# Patient Record
Sex: MALE | Race: WHITE | NOT HISPANIC OR LATINO
[De-identification: names, ages, dates, MRNs, and addresses within clinical notes are randomized per-mention and may not be internally consistent; named-entity substitution may affect disease eponyms.]

---

## 2017-06-06 ENCOUNTER — APPOINTMENT (OUTPATIENT)
Dept: ELECTROPHYSIOLOGY | Facility: CLINIC | Age: 82
End: 2017-06-06

## 2017-06-06 VITALS
WEIGHT: 172 LBS | BODY MASS INDEX: 24.08 KG/M2 | SYSTOLIC BLOOD PRESSURE: 105 MMHG | HEART RATE: 59 BPM | HEIGHT: 71 IN | DIASTOLIC BLOOD PRESSURE: 58 MMHG

## 2017-09-13 ENCOUNTER — APPOINTMENT (OUTPATIENT)
Dept: ELECTROPHYSIOLOGY | Facility: CLINIC | Age: 82
End: 2017-09-13
Payer: MEDICARE

## 2017-09-13 VITALS
HEART RATE: 59 BPM | BODY MASS INDEX: 24.08 KG/M2 | SYSTOLIC BLOOD PRESSURE: 153 MMHG | DIASTOLIC BLOOD PRESSURE: 80 MMHG | HEIGHT: 71 IN | WEIGHT: 172 LBS

## 2017-09-13 PROCEDURE — 93279 PRGRMG DEV EVAL PM/LDLS PM: CPT

## 2018-01-12 ENCOUNTER — APPOINTMENT (OUTPATIENT)
Dept: ELECTROPHYSIOLOGY | Facility: CLINIC | Age: 83
End: 2018-01-12
Payer: MEDICARE

## 2018-01-12 VITALS
HEART RATE: 60 BPM | SYSTOLIC BLOOD PRESSURE: 129 MMHG | DIASTOLIC BLOOD PRESSURE: 64 MMHG | BODY MASS INDEX: 24.08 KG/M2 | WEIGHT: 172 LBS | HEIGHT: 71 IN

## 2018-01-12 PROCEDURE — 93280 PM DEVICE PROGR EVAL DUAL: CPT

## 2018-04-17 ENCOUNTER — APPOINTMENT (OUTPATIENT)
Dept: ELECTROPHYSIOLOGY | Facility: CLINIC | Age: 83
End: 2018-04-17
Payer: MEDICARE

## 2018-04-17 PROCEDURE — 93296 REM INTERROG EVL PM/IDS: CPT

## 2018-04-17 PROCEDURE — 93294 REM INTERROG EVL PM/LDLS PM: CPT

## 2018-08-03 ENCOUNTER — APPOINTMENT (OUTPATIENT)
Dept: ELECTROPHYSIOLOGY | Facility: CLINIC | Age: 83
End: 2018-08-03
Payer: MEDICARE

## 2018-08-03 VITALS — DIASTOLIC BLOOD PRESSURE: 49 MMHG | SYSTOLIC BLOOD PRESSURE: 88 MMHG | HEART RATE: 59 BPM | HEIGHT: 71 IN

## 2018-08-03 VITALS — WEIGHT: 168.4 LBS | BODY MASS INDEX: 23.49 KG/M2

## 2018-08-03 PROCEDURE — 93280 PM DEVICE PROGR EVAL DUAL: CPT

## 2018-11-13 ENCOUNTER — APPOINTMENT (OUTPATIENT)
Dept: ELECTROPHYSIOLOGY | Facility: CLINIC | Age: 83
End: 2018-11-13
Payer: MEDICARE

## 2018-11-13 PROCEDURE — 93294 REM INTERROG EVL PM/LDLS PM: CPT

## 2018-11-13 PROCEDURE — 93296 REM INTERROG EVL PM/IDS: CPT

## 2019-01-06 ENCOUNTER — INPATIENT (INPATIENT)
Facility: HOSPITAL | Age: 84
LOS: 1 days | Discharge: HOME HEALTH | End: 2019-01-08
Payer: MEDICARE

## 2019-01-06 VITALS — WEIGHT: 175.05 LBS | HEIGHT: 70 IN

## 2019-01-06 DIAGNOSIS — I25.10 ATHEROSCLEROTIC HEART DISEASE OF NATIVE CORONARY ARTERY WITHOUT ANGINA PECTORIS: ICD-10-CM

## 2019-01-06 DIAGNOSIS — N40.0 BENIGN PROSTATIC HYPERPLASIA WITHOUT LOWER URINARY TRACT SYMPTOMS: ICD-10-CM

## 2019-01-06 DIAGNOSIS — I48.91 UNSPECIFIED ATRIAL FIBRILLATION: ICD-10-CM

## 2019-01-06 DIAGNOSIS — Z95.0 PRESENCE OF CARDIAC PACEMAKER: Chronic | ICD-10-CM

## 2019-01-06 DIAGNOSIS — Z95.1 PRESENCE OF AORTOCORONARY BYPASS GRAFT: Chronic | ICD-10-CM

## 2019-01-06 DIAGNOSIS — I50.23 ACUTE ON CHRONIC SYSTOLIC (CONGESTIVE) HEART FAILURE: ICD-10-CM

## 2019-01-06 LAB
ALBUMIN SERPL ELPH-MCNC: 3.2 G/DL — LOW (ref 3.3–5)
ALP SERPL-CCNC: 97 U/L — SIGNIFICANT CHANGE UP (ref 40–120)
ALT FLD-CCNC: 44 U/L — SIGNIFICANT CHANGE UP (ref 12–78)
ANION GAP SERPL CALC-SCNC: 8 MMOL/L — SIGNIFICANT CHANGE UP (ref 5–17)
ANISOCYTOSIS BLD QL: SLIGHT — SIGNIFICANT CHANGE UP
APPEARANCE UR: CLEAR — SIGNIFICANT CHANGE UP
AST SERPL-CCNC: 24 U/L — SIGNIFICANT CHANGE UP (ref 15–37)
BACTERIA # UR AUTO: ABNORMAL
BASOPHILS # BLD AUTO: 0.04 K/UL — SIGNIFICANT CHANGE UP (ref 0–0.2)
BASOPHILS NFR BLD AUTO: 0.5 % — SIGNIFICANT CHANGE UP (ref 0–2)
BILIRUB SERPL-MCNC: 0.9 MG/DL — SIGNIFICANT CHANGE UP (ref 0.2–1.2)
BILIRUB UR-MCNC: NEGATIVE — SIGNIFICANT CHANGE UP
BIZARRE PLATELETS BLD QL SMEAR: PRESENT — SIGNIFICANT CHANGE UP
BUN SERPL-MCNC: 34 MG/DL — HIGH (ref 7–23)
BURR CELLS BLD QL SMEAR: PRESENT — SIGNIFICANT CHANGE UP
CALCIUM SERPL-MCNC: 8.9 MG/DL — SIGNIFICANT CHANGE UP (ref 8.5–10.1)
CHLORIDE SERPL-SCNC: 112 MMOL/L — HIGH (ref 96–108)
CO2 SERPL-SCNC: 23 MMOL/L — SIGNIFICANT CHANGE UP (ref 22–31)
COLOR SPEC: YELLOW — SIGNIFICANT CHANGE UP
CREAT SERPL-MCNC: 1.43 MG/DL — HIGH (ref 0.5–1.3)
DIFF PNL FLD: NEGATIVE — SIGNIFICANT CHANGE UP
ELLIPTOCYTES BLD QL SMEAR: SLIGHT — SIGNIFICANT CHANGE UP
EOSINOPHIL # BLD AUTO: 0.1 K/UL — SIGNIFICANT CHANGE UP (ref 0–0.5)
EOSINOPHIL NFR BLD AUTO: 1.3 % — SIGNIFICANT CHANGE UP (ref 0–6)
EPI CELLS # UR: SIGNIFICANT CHANGE UP
GLUCOSE SERPL-MCNC: 146 MG/DL — HIGH (ref 70–99)
GLUCOSE UR QL: NEGATIVE MG/DL — SIGNIFICANT CHANGE UP
HCT VFR BLD CALC: 34.6 % — LOW (ref 39–50)
HGB BLD-MCNC: 11.2 G/DL — LOW (ref 13–17)
IMM GRANULOCYTES NFR BLD AUTO: 0.4 % — SIGNIFICANT CHANGE UP (ref 0–1.5)
INR BLD: 1.18 RATIO — HIGH (ref 0.88–1.16)
KETONES UR-MCNC: NEGATIVE — SIGNIFICANT CHANGE UP
LEUKOCYTE ESTERASE UR-ACNC: ABNORMAL
LYMPHOCYTES # BLD AUTO: 0.49 K/UL — LOW (ref 1–3.3)
LYMPHOCYTES # BLD AUTO: 6.2 % — LOW (ref 13–44)
MACROCYTES BLD QL: SLIGHT — SIGNIFICANT CHANGE UP
MANUAL SMEAR VERIFICATION: SIGNIFICANT CHANGE UP
MCHC RBC-ENTMCNC: 32 PG — SIGNIFICANT CHANGE UP (ref 27–34)
MCHC RBC-ENTMCNC: 32.4 GM/DL — SIGNIFICANT CHANGE UP (ref 32–36)
MCV RBC AUTO: 98.9 FL — SIGNIFICANT CHANGE UP (ref 80–100)
MONOCYTES # BLD AUTO: 0.85 K/UL — SIGNIFICANT CHANGE UP (ref 0–0.9)
MONOCYTES NFR BLD AUTO: 10.7 % — SIGNIFICANT CHANGE UP (ref 2–14)
NEUTROPHILS # BLD AUTO: 6.45 K/UL — SIGNIFICANT CHANGE UP (ref 1.8–7.4)
NEUTROPHILS NFR BLD AUTO: 80.9 % — HIGH (ref 43–77)
NITRITE UR-MCNC: NEGATIVE — SIGNIFICANT CHANGE UP
NRBC # BLD: 0 /100 WBCS — SIGNIFICANT CHANGE UP (ref 0–0)
NT-PROBNP SERPL-SCNC: 8544 PG/ML — HIGH (ref 0–450)
OVALOCYTES BLD QL SMEAR: SLIGHT — SIGNIFICANT CHANGE UP
PH UR: 6 — SIGNIFICANT CHANGE UP (ref 5–8)
PLAT MORPH BLD: NORMAL — SIGNIFICANT CHANGE UP
PLATELET # BLD AUTO: 111 K/UL — LOW (ref 150–400)
POIKILOCYTOSIS BLD QL AUTO: SLIGHT — SIGNIFICANT CHANGE UP
POTASSIUM SERPL-MCNC: 4.5 MMOL/L — SIGNIFICANT CHANGE UP (ref 3.5–5.3)
POTASSIUM SERPL-SCNC: 4.5 MMOL/L — SIGNIFICANT CHANGE UP (ref 3.5–5.3)
PROT SERPL-MCNC: 7.4 GM/DL — SIGNIFICANT CHANGE UP (ref 6–8.3)
PROT UR-MCNC: 15 MG/DL
PROTHROM AB SERPL-ACNC: 13.2 SEC — HIGH (ref 10–12.9)
RAPID RVP RESULT: SIGNIFICANT CHANGE UP
RBC # BLD: 3.5 M/UL — LOW (ref 4.2–5.8)
RBC # FLD: 14.6 % — HIGH (ref 10.3–14.5)
RBC BLD AUTO: ABNORMAL
RBC CASTS # UR COMP ASSIST: SIGNIFICANT CHANGE UP /HPF (ref 0–4)
SODIUM SERPL-SCNC: 143 MMOL/L — SIGNIFICANT CHANGE UP (ref 135–145)
SP GR SPEC: 1.01 — SIGNIFICANT CHANGE UP (ref 1.01–1.02)
TROPONIN I SERPL-MCNC: 0.06 NG/ML — HIGH (ref 0.01–0.04)
TROPONIN I SERPL-MCNC: 0.07 NG/ML — HIGH (ref 0.01–0.04)
UROBILINOGEN FLD QL: NEGATIVE MG/DL — SIGNIFICANT CHANGE UP
WBC # BLD: 7.96 K/UL — SIGNIFICANT CHANGE UP (ref 3.8–10.5)
WBC # FLD AUTO: 7.96 K/UL — SIGNIFICANT CHANGE UP (ref 3.8–10.5)
WBC UR QL: ABNORMAL

## 2019-01-06 PROCEDURE — 93010 ELECTROCARDIOGRAM REPORT: CPT

## 2019-01-06 PROCEDURE — 71045 X-RAY EXAM CHEST 1 VIEW: CPT | Mod: 26

## 2019-01-06 PROCEDURE — 99285 EMERGENCY DEPT VISIT HI MDM: CPT

## 2019-01-06 PROCEDURE — 71250 CT THORAX DX C-: CPT | Mod: 26

## 2019-01-06 RX ORDER — CARVEDILOL PHOSPHATE 80 MG/1
12.5 CAPSULE, EXTENDED RELEASE ORAL EVERY 12 HOURS
Qty: 0 | Refills: 0 | Status: DISCONTINUED | OUTPATIENT
Start: 2019-01-06 | End: 2019-01-08

## 2019-01-06 RX ORDER — FUROSEMIDE 40 MG
40 TABLET ORAL ONCE
Qty: 0 | Refills: 0 | Status: COMPLETED | OUTPATIENT
Start: 2019-01-06 | End: 2019-01-06

## 2019-01-06 RX ORDER — ATORVASTATIN CALCIUM 80 MG/1
1 TABLET, FILM COATED ORAL
Qty: 0 | Refills: 0 | COMMUNITY

## 2019-01-06 RX ORDER — FUROSEMIDE 40 MG
20 TABLET ORAL DAILY
Qty: 0 | Refills: 0 | Status: DISCONTINUED | OUTPATIENT
Start: 2019-01-06 | End: 2019-01-07

## 2019-01-06 RX ORDER — TAMSULOSIN HYDROCHLORIDE 0.4 MG/1
0.4 CAPSULE ORAL AT BEDTIME
Qty: 0 | Refills: 0 | Status: DISCONTINUED | OUTPATIENT
Start: 2019-01-06 | End: 2019-01-08

## 2019-01-06 RX ORDER — HEPARIN SODIUM 5000 [USP'U]/ML
5000 INJECTION INTRAVENOUS; SUBCUTANEOUS EVERY 12 HOURS
Qty: 0 | Refills: 0 | Status: DISCONTINUED | OUTPATIENT
Start: 2019-01-06 | End: 2019-01-08

## 2019-01-06 RX ORDER — ALPRAZOLAM 0.25 MG
1 TABLET ORAL
Qty: 0 | Refills: 0 | COMMUNITY

## 2019-01-06 RX ORDER — MEMANTINE HYDROCHLORIDE 10 MG/1
10 TABLET ORAL
Qty: 0 | Refills: 0 | Status: DISCONTINUED | OUTPATIENT
Start: 2019-01-06 | End: 2019-01-08

## 2019-01-06 RX ORDER — MULTIVIT-MIN/FERROUS GLUCONATE 9 MG/15 ML
1 LIQUID (ML) ORAL DAILY
Qty: 0 | Refills: 0 | Status: DISCONTINUED | OUTPATIENT
Start: 2019-01-06 | End: 2019-01-08

## 2019-01-06 RX ORDER — MEMANTINE HYDROCHLORIDE 10 MG/1
1 TABLET ORAL
Qty: 0 | Refills: 0 | COMMUNITY

## 2019-01-06 RX ORDER — LEVOTHYROXINE SODIUM 125 MCG
50 TABLET ORAL DAILY
Qty: 0 | Refills: 0 | Status: DISCONTINUED | OUTPATIENT
Start: 2019-01-06 | End: 2019-01-08

## 2019-01-06 RX ORDER — FERROUS SULFATE 325(65) MG
325 TABLET ORAL DAILY
Qty: 0 | Refills: 0 | Status: DISCONTINUED | OUTPATIENT
Start: 2019-01-06 | End: 2019-01-08

## 2019-01-06 RX ORDER — TAMSULOSIN HYDROCHLORIDE 0.4 MG/1
1 CAPSULE ORAL
Qty: 0 | Refills: 0 | COMMUNITY

## 2019-01-06 RX ORDER — ALPRAZOLAM 0.25 MG
0.25 TABLET ORAL AT BEDTIME
Qty: 0 | Refills: 0 | Status: DISCONTINUED | OUTPATIENT
Start: 2019-01-06 | End: 2019-01-08

## 2019-01-06 RX ORDER — ATORVASTATIN CALCIUM 80 MG/1
40 TABLET, FILM COATED ORAL AT BEDTIME
Qty: 0 | Refills: 0 | Status: DISCONTINUED | OUTPATIENT
Start: 2019-01-06 | End: 2019-01-08

## 2019-01-06 RX ORDER — CHOLECALCIFEROL (VITAMIN D3) 125 MCG
1000 CAPSULE ORAL DAILY
Qty: 0 | Refills: 0 | Status: DISCONTINUED | OUTPATIENT
Start: 2019-01-06 | End: 2019-01-08

## 2019-01-06 RX ORDER — DONEPEZIL HYDROCHLORIDE 10 MG/1
5 TABLET, FILM COATED ORAL AT BEDTIME
Qty: 0 | Refills: 0 | Status: DISCONTINUED | OUTPATIENT
Start: 2019-01-06 | End: 2019-01-08

## 2019-01-06 RX ADMIN — DONEPEZIL HYDROCHLORIDE 5 MILLIGRAM(S): 10 TABLET, FILM COATED ORAL at 21:40

## 2019-01-06 RX ADMIN — TAMSULOSIN HYDROCHLORIDE 0.4 MILLIGRAM(S): 0.4 CAPSULE ORAL at 21:40

## 2019-01-06 RX ADMIN — Medication 1000 UNIT(S): at 19:48

## 2019-01-06 RX ADMIN — CARVEDILOL PHOSPHATE 12.5 MILLIGRAM(S): 80 CAPSULE, EXTENDED RELEASE ORAL at 18:48

## 2019-01-06 RX ADMIN — MEMANTINE HYDROCHLORIDE 10 MILLIGRAM(S): 10 TABLET ORAL at 19:47

## 2019-01-06 RX ADMIN — HEPARIN SODIUM 5000 UNIT(S): 5000 INJECTION INTRAVENOUS; SUBCUTANEOUS at 19:47

## 2019-01-06 RX ADMIN — ATORVASTATIN CALCIUM 40 MILLIGRAM(S): 80 TABLET, FILM COATED ORAL at 21:40

## 2019-01-06 RX ADMIN — Medication 1 TABLET(S): at 19:56

## 2019-01-06 RX ADMIN — Medication 40 MILLIGRAM(S): at 16:00

## 2019-01-06 RX ADMIN — Medication 325 MILLIGRAM(S): at 19:47

## 2019-01-06 NOTE — ED PROVIDER NOTE - MEDICAL DECISION MAKING DETAILS
Pt with evidence of CHF exacerbation.  Afebrile, normal WBC, pending RVP.  Given Lasix 40 mg IV.  EKG nonischemic, paced.  Admit to medicine tele service.

## 2019-01-06 NOTE — ED ADULT NURSE NOTE - NSIMPLEMENTINTERV_GEN_ALL_ED
Implemented All Fall Risk Interventions:  [place] to call system. Call bell, personal items and telephone within reach. Instruct patient to call for assistance. Room bathroom lighting operational. Non-slip footwear when patient is off stretcher. Physically safe environment: no spills, clutter or unnecessary equipment. Stretcher in lowest position, wheels locked, appropriate side rails in place. Provide visual cue, wrist band, yellow gown, etc. Monitor gait and stability. Monitor for mental status changes and reorient to person, place, and time. Review medications for side effects contributing to fall risk. Reinforce activity limits and safety measures with patient and family.

## 2019-01-06 NOTE — ED ADULT TRIAGE NOTE - CHIEF COMPLAINT QUOTE
pt presents to ED c/o SOB x 2 days, reports hx CHF states may be CHF exacerbation. pt in no acute distress at triage, no labored breathing noted. pt sent directly to intake room for vs.

## 2019-01-06 NOTE — H&P ADULT - NSHPREVIEWOFSYSTEMS_GEN_ALL_CORE
Review of Systems: as in HPI     Eyes: no changes in vision    ENT/Mouth: no changes    Cardiovascular: no chest pain    Respiratory: Exertional dyspnea ,     Gastrointestinal: no diarrhea, no nausea, no vomiting    Genitourinary: no dysuria    Breast: no pain    Musculoskeletal: no pain    Integumentary: no itching    Neurological: No Headache, no tremor,    Psychiatric: no suicidal ideations    Endocrine: no excessive thirst,     Hematologic/Lymphatic: no swollen glands    Allergic/Immunologic: no itching

## 2019-01-06 NOTE — H&P ADULT - NSHPLABSRESULTS_GEN_ALL_CORE
11.2   7.96  )-----------( 111      ( 06 Jan 2019 13:37 )             34.6       01-06    143  |  112<H>  |  34<H>  ----------------------------<  146<H>  4.5   |  23  |  1.43<H>    Ca    8.9      06 Jan 2019 13:37    TPro  7.4  /  Alb  3.2<L>  /  TBili  0.9  /  DBili  x   /  AST  24  /  ALT  44  /  AlkPhos  97  01-06    Trop 0.07,0.058, BNP 8544    UA, wbc 11-25.   CT Chest: Pulmonary edema.Indeterminate left renal lesions, for which further evaluation with renal   ultrasound or contrast-enhanced CT abdomen (renal mass protocol is   recommended.    RVP neg.

## 2019-01-06 NOTE — ED ADULT NURSE NOTE - OBJECTIVE STATEMENT
Pt complains of worsening shortness of breath over past two days.  Hx CHF.  EKG done on arrival.  Cardiac and VS monitoring initiated.  20g PIV placed in LAC.  RVP isolation initiated.  Family at bedside, plan of care and isolation explained.  Pt in no acute distress.

## 2019-01-06 NOTE — ED PROVIDER NOTE - OBJECTIVE STATEMENT
89 y/o M with a PMHx of CHF presenting to the ED c/o SOB starting 2 days ago. Pt and pt's family stated pt has had a non-productive cough for a few days, however SOB was worse today prompting ED visit. Pt denies CP, CA, other lung problems, edema, nasal congestion, or rhinorrhea, and is not on O2 at home. Denies other sick contacts. NKDA. PMD- Dr. Sandoval. Cardiologist- Dr. Colmenares.

## 2019-01-06 NOTE — H&P ADULT - NSHPPHYSICALEXAM_GEN_ALL_CORE
Physical Exam: Vital Signs Last 24 Hrs  T(C): 36.3 (06 Jan 2019 13:21), Max: 36.3 (06 Jan 2019 13:21)  T(F): 97.4 (06 Jan 2019 13:21), Max: 97.4 (06 Jan 2019 13:21)  HR: 60 (06 Jan 2019 14:30) (60 - 63)  BP: 122/64 (06 Jan 2019 14:30) (122/64 - 150/70)  BP(mean): --  RR: 17 (06 Jan 2019 13:30) (17 - 17)  SpO2: 96% (06 Jan 2019 13:30) (96% - 100%)        HEENT: PRRL EOMI    MOUTH/TEETH/GUMS: Clear    NECK: +1 JVD    LUNGS: bibasilar rales     HEART: S1,S2 RR, s/p PPM     ABDOMEN: soft nontender    EXTREMITIES: no pedal edema    MUSCULOSKELETAL:     NEURO: AO, no tremor, no focal signs.    SKIN: no rash    : CVA negative,

## 2019-01-06 NOTE — H&P ADULT - ASSESSMENT
89 yo male with Hx. of CAD s/p CABG, CHF,  Atrial fib not on AC, becasue of bruising and bleeding, s/p PPM,   who pressented in the ER for worsening SOB for the past few days.

## 2019-01-07 LAB
ANION GAP SERPL CALC-SCNC: 8 MMOL/L — SIGNIFICANT CHANGE UP (ref 5–17)
BASOPHILS # BLD AUTO: 0.05 K/UL — SIGNIFICANT CHANGE UP (ref 0–0.2)
BASOPHILS NFR BLD AUTO: 0.7 % — SIGNIFICANT CHANGE UP (ref 0–2)
BUN SERPL-MCNC: 36 MG/DL — HIGH (ref 7–23)
CALCIUM SERPL-MCNC: 8.6 MG/DL — SIGNIFICANT CHANGE UP (ref 8.5–10.1)
CHLORIDE SERPL-SCNC: 111 MMOL/L — HIGH (ref 96–108)
CO2 SERPL-SCNC: 24 MMOL/L — SIGNIFICANT CHANGE UP (ref 22–31)
CREAT SERPL-MCNC: 1.47 MG/DL — HIGH (ref 0.5–1.3)
EOSINOPHIL # BLD AUTO: 0.2 K/UL — SIGNIFICANT CHANGE UP (ref 0–0.5)
EOSINOPHIL NFR BLD AUTO: 2.9 % — SIGNIFICANT CHANGE UP (ref 0–6)
GLUCOSE SERPL-MCNC: 95 MG/DL — SIGNIFICANT CHANGE UP (ref 70–99)
HCT VFR BLD CALC: 30.7 % — LOW (ref 39–50)
HGB BLD-MCNC: 9.9 G/DL — LOW (ref 13–17)
IMM GRANULOCYTES NFR BLD AUTO: 0.3 % — SIGNIFICANT CHANGE UP (ref 0–1.5)
LYMPHOCYTES # BLD AUTO: 0.78 K/UL — LOW (ref 1–3.3)
LYMPHOCYTES # BLD AUTO: 11.1 % — LOW (ref 13–44)
MCHC RBC-ENTMCNC: 31.2 PG — SIGNIFICANT CHANGE UP (ref 27–34)
MCHC RBC-ENTMCNC: 32.2 GM/DL — SIGNIFICANT CHANGE UP (ref 32–36)
MCV RBC AUTO: 96.8 FL — SIGNIFICANT CHANGE UP (ref 80–100)
MONOCYTES # BLD AUTO: 0.95 K/UL — HIGH (ref 0–0.9)
MONOCYTES NFR BLD AUTO: 13.6 % — SIGNIFICANT CHANGE UP (ref 2–14)
NEUTROPHILS # BLD AUTO: 5 K/UL — SIGNIFICANT CHANGE UP (ref 1.8–7.4)
NEUTROPHILS NFR BLD AUTO: 71.4 % — SIGNIFICANT CHANGE UP (ref 43–77)
NRBC # BLD: 0 /100 WBCS — SIGNIFICANT CHANGE UP (ref 0–0)
PLATELET # BLD AUTO: 113 K/UL — LOW (ref 150–400)
POTASSIUM SERPL-MCNC: 4 MMOL/L — SIGNIFICANT CHANGE UP (ref 3.5–5.3)
POTASSIUM SERPL-SCNC: 4 MMOL/L — SIGNIFICANT CHANGE UP (ref 3.5–5.3)
RBC # BLD: 3.17 M/UL — LOW (ref 4.2–5.8)
RBC # FLD: 14.5 % — SIGNIFICANT CHANGE UP (ref 10.3–14.5)
SODIUM SERPL-SCNC: 143 MMOL/L — SIGNIFICANT CHANGE UP (ref 135–145)
WBC # BLD: 7 K/UL — SIGNIFICANT CHANGE UP (ref 3.8–10.5)
WBC # FLD AUTO: 7 K/UL — SIGNIFICANT CHANGE UP (ref 3.8–10.5)

## 2019-01-07 PROCEDURE — 93308 TTE F-UP OR LMTD: CPT | Mod: 26

## 2019-01-07 PROCEDURE — 76770 US EXAM ABDO BACK WALL COMP: CPT | Mod: 26

## 2019-01-07 RX ORDER — ASPIRIN/CALCIUM CARB/MAGNESIUM 324 MG
81 TABLET ORAL DAILY
Qty: 0 | Refills: 0 | Status: DISCONTINUED | OUTPATIENT
Start: 2019-01-07 | End: 2019-01-08

## 2019-01-07 RX ORDER — FUROSEMIDE 40 MG
40 TABLET ORAL DAILY
Qty: 0 | Refills: 0 | Status: DISCONTINUED | OUTPATIENT
Start: 2019-01-07 | End: 2019-01-08

## 2019-01-07 RX ADMIN — CARVEDILOL PHOSPHATE 12.5 MILLIGRAM(S): 80 CAPSULE, EXTENDED RELEASE ORAL at 17:57

## 2019-01-07 RX ADMIN — Medication 81 MILLIGRAM(S): at 17:50

## 2019-01-07 RX ADMIN — Medication 50 MICROGRAM(S): at 06:27

## 2019-01-07 RX ADMIN — MEMANTINE HYDROCHLORIDE 10 MILLIGRAM(S): 10 TABLET ORAL at 22:10

## 2019-01-07 RX ADMIN — Medication 1 TABLET(S): at 18:04

## 2019-01-07 RX ADMIN — MEMANTINE HYDROCHLORIDE 10 MILLIGRAM(S): 10 TABLET ORAL at 06:27

## 2019-01-07 RX ADMIN — HEPARIN SODIUM 5000 UNIT(S): 5000 INJECTION INTRAVENOUS; SUBCUTANEOUS at 17:57

## 2019-01-07 RX ADMIN — ATORVASTATIN CALCIUM 40 MILLIGRAM(S): 80 TABLET, FILM COATED ORAL at 22:10

## 2019-01-07 RX ADMIN — Medication 1000 UNIT(S): at 17:50

## 2019-01-07 RX ADMIN — DONEPEZIL HYDROCHLORIDE 5 MILLIGRAM(S): 10 TABLET, FILM COATED ORAL at 22:10

## 2019-01-07 RX ADMIN — CARVEDILOL PHOSPHATE 12.5 MILLIGRAM(S): 80 CAPSULE, EXTENDED RELEASE ORAL at 06:27

## 2019-01-07 RX ADMIN — HEPARIN SODIUM 5000 UNIT(S): 5000 INJECTION INTRAVENOUS; SUBCUTANEOUS at 06:27

## 2019-01-07 RX ADMIN — Medication 40 MILLIGRAM(S): at 13:39

## 2019-01-07 RX ADMIN — TAMSULOSIN HYDROCHLORIDE 0.4 MILLIGRAM(S): 0.4 CAPSULE ORAL at 22:10

## 2019-01-07 RX ADMIN — Medication 325 MILLIGRAM(S): at 17:50

## 2019-01-07 NOTE — CONSULT NOTE ADULT - SUBJECTIVE AND OBJECTIVE BOX
Patient is a 89y old  Male who presents with a chief complaint of CHF, Pulm Edema (2019 09:11)      HPI:  The patient is a 89 yo male with Hx. of CAD s/p CABG, CHF,  Atrial fib not on AC, becasue of bruising and bleeding, s/p PPM, [place]  who pressented in the ER for worsening SOB for the past few days.    pt is well known with prior thoracentesis done for eval his effusions in the past  now seating up in bed  seen and examined in his room  ct scan data discussed with him  no cp  no distress    Family Hx.: Mother  of old age at 87,  Father had CVA at age 67, (2019 17:56)      PAST MEDICAL & SURGICAL HISTORY:  CHF (congestive heart failure)  Artificial pacemaker  S/P CABG (coronary artery bypass graft)      PREVIOUS DIAGNOSTIC TESTING:      MEDICATIONS  (STANDING):  aspirin enteric coated 81 milliGRAM(s) Oral daily  atorvastatin 40 milliGRAM(s) Oral at bedtime  carvedilol 12.5 milliGRAM(s) Oral every 12 hours  cholecalciferol 1000 Unit(s) Oral daily  donepezil 5 milliGRAM(s) Oral at bedtime  ferrous    sulfate 325 milliGRAM(s) Oral daily  furosemide   Injectable 40 milliGRAM(s) IV Push daily  heparin  Injectable 5000 Unit(s) SubCutaneous every 12 hours  levothyroxine 50 MICROGram(s) Oral daily  memantine 10 milliGRAM(s) Oral two times a day  multivitamin/minerals 1 Tablet(s) Oral daily  tamsulosin 0.4 milliGRAM(s) Oral at bedtime    MEDICATIONS  (PRN):  ALPRAZolam 0.25 milliGRAM(s) Oral at bedtime PRN anxiety      FAMILY HISTORY:      SOCIAL HISTORY:  ***    REVIEW OF SYSTEM:  Pertinent items are noted in HPI.  Constitutional negative for chills, fevers, sweats and weight loss  throat, and face:  negative for epistaxis, nasal congestion, sore throat and   tinnitus  Respiratory: negative for cough,pos dyspnea on exertion, pleuritic chest pain  and wheezing  Cardiovascular:  negative for chest pain,pos dyspnea and palpitations  Gastrointestinal: negative for abdominal pain, diarrhea, nausea and vomiting  Genitourinary: negative for dysuria, frequency and urinary incontinence  Skin:  negative for redness, rash, pruritus, swelling, dryness and   fissures  Hematologic/lymphatic: negative for bleeding and easy bruising  Allergic/Immunologic: negative for anaphylaxis, angioedema and urticaria    Vital Signs Last 24 Hrs  T(C): 36.9 (2019 05:07), Max: 36.9 (2019 05:07)  T(F): 98.4 (2019 05:07), Max: 98.4 (2019 05:07)  HR: 60 (2019 05:07) (60 - 63)  BP: 120/52 (2019 05:07) (120/52 - 166/94)  BP(mean): --  RR: 16 (2019 05:07) (16 - 17)  SpO2: 96% (2019 05:07) (96% - 100%)    I&O's Summary    2019 07:01  -  2019 07:00  --------------------------------------------------------  IN: 0 mL / OUT: 900 mL / NET: -900 mL      PHYSICAL EXAM  General Appearance: cooperative, no acute distress,   HEENT: PERRL, conjunctiva clear, EOM's intact, non injected pharynx, no exudate, TM   normal  Neck: Supple, , no adenopathy, thyroid: not enlarged, no carotid bruit or JVD  Back: Symmetric, no  tenderness,no soft tissue tenderness  Lungs: Decreased breath sounds right more than left lung base  few basilar crackles  Heart: Regular rate and rhythm, S1, S2 normal, no murmur, rub or gallop  Abdomen: Soft, non-tender, bowel sounds active , no hepatosplenomegaly  Extremities: no cyanosis, pos edema, no joint swelling  Skin: Skin color, texture normal, no rashes   Neurologic: Alert and oriented X3 , cranial nerves intact, sensory and motor normal,    ECG:    LABS:                          9.9    7.00  )-----------( 113      ( 2019 05:43 )             30.7     -07    143  |  111<H>  |  36<H>  ----------------------------<  95  4.0   |  24  |  1.47<H>    Ca    8.6      2019 05:43    TPro  7.4  /  Alb  3.2<L>  /  TBili  0.9  /  DBili  x   /  AST  24  /  ALT  44  /  AlkPhos  97  06    CARDIAC MARKERS ( 2019 16:37 )  0.058 ng/mL / x     / x     / x     / x      CARDIAC MARKERS ( 2019 13:37 )  0.070 ng/mL / x     / x     / x     / x            Pro BNP  8544  @ 13:37  D Dimer  --  @ 13:37    PT/INR - ( 2019 13:37 )   PT: 13.2 sec;   INR: 1.18 ratio           Urinalysis Basic - ( 2019 16:56 )    Color: Yellow / Appearance: Clear / S.010 / pH: x  Gluc: x / Ketone: Negative  / Bili: Negative / Urobili: Negative mg/dL   Blood: x / Protein: 15 mg/dL / Nitrite: Negative   Leuk Esterase: Small / RBC: 0-2 /HPF / WBC 11-25   Sq Epi: x / Non Sq Epi: Few / Bacteria: Moderate    < from: CT Chest No Cont (19 @ 14:45) >  COMPARISON: 2016.    PROCEDURE:   CT of the Chest was performed without intravenous contrast.  Sagittal and coronal reformats were performed.    FINDINGS:    LUNGS AND LARGE AIRWAYS: Bilateral perihilar opacities.  PLEURA: Small bilateral pleural effusions.  VESSELS: Atherosclerotic calcifications.   HEART: Heart size is enlarged. Left chest wall dual lead cardiac   pacemaker. Aortic valve replacement. Dense calcification of the mitral   annulus. No pericardial effusion.  MEDIASTINUM AND ANKIT: Subcentimeter short axis mediastinal lymph nodes.  CHEST WALL AND LOWER NECK: Within normal limits.  VISUALIZED UPPER ABDOMEN: Indeterminate lesions in the left kidney,   measuring up to 1.1 cm. Bilateral renal cysts.  BONES: Degenerative changes of the spine. Sternotomy. Old left sided rib   fractures.    IMPRESSION:   Pulmonary edema.    Indeterminate left renal lesions, for which further evaluation with renal   ultrasound or contrast-enhanced CT abdomen (renal mass protocol is   recommended.    < end of copied text >          RADIOLOGY & ADDITIONAL STUDIES:

## 2019-01-07 NOTE — DIETITIAN INITIAL EVALUATION ADULT. - PERTINENT LABORATORY DATA
01-07 Na143 mmol/L Glu 95 mg/dL K+ 4.0 mmol/L Cr  1.47 mg/dL<H> BUN 36 mg/dL<H> Phos n/a   Alb n/a   PAB n/a

## 2019-01-07 NOTE — PHYSICAL THERAPY INITIAL EVALUATION ADULT - MODALITIES TREATMENT COMMENTS
pt left seated in chair post Eval; chair alarm donned; O2 3L/min nc, HM in place; NA [place] present; callbell in reach; pt instructed not to get up alone; call nursing for assist; marcello well; denied pain

## 2019-01-07 NOTE — PROGRESS NOTE ADULT - SUBJECTIVE AND OBJECTIVE BOX
89 yo male with Hx of CAD s/p CABG, chronic diastolic CHF,  Atrial fib not on AC because of bruising and bleeding, s/p PPM, [place]  who presented in the ER for worsening SOB for the past few days.  Patient admitted for CHF exacerbation.      :  Patient seen.  Had renal US.  On diuresis.      Review of system- Rest of the review of system are negative except mentioned in HPI    Vital Signs Last 24 Hrs  T(C): 36.7 (2019 17:47), Max: 36.9 (2019 05:07)  T(F): 98 (2019 17:47), Max: 98.4 (2019 05:07)  HR: 60 (2019 17:47) (60 - 60)  BP: 159/73 (2019 17:47) (120/52 - 166/94)  BP(mean): --  RR: 18 (2019 17:47) (16 - 18)  SpO2: 100% (2019 17:47) (95% - 100%)    PHYSICAL EXAM:  GENERAL: NAD  NERVOUS SYSTEM:  Alert & Oriented X3, non- focal exam, Motor Strength 5/5 B/L upper and lower extremities; DTRs 2+ intact and symmetric  HEAD:  Atraumatic, Normocephalic  EYES: EOMI, PERRLA, conjunctiva and sclera clear  HEENT: Moist mucous membranes  NECK: Supple, No JVD  CHEST/LUNG: decreased BS  HEART: Regular rate and rhythm; No murmurs, rubs, or gallops  ABDOMEN: Soft, Nontender, Nondistended; Bowel sounds present  GENITOURINARY- Voiding, no suprapubic tenderness  EXTREMITIES:  2+ Peripheral Pulses, No clubbing, cyanosis, or edema  MUSCULOSKELETAL:- No muscle tenderness, Muscle tone normal, No joint tenderness, no Joint swelling, Joint range of motion-normal  SKIN-no rash, no lesion    LABS:                        9.9    7.00  )-----------( 113      ( 2019 05:43 )             30.7     -    143  |  111<H>  |  36<H>  ----------------------------<  95  4.0   |  24  |  1.47<H>    Ca    8.6      2019 05:43    TPro  7.4  /  Alb  3.2<L>  /  TBili  0.9  /  DBili  x   /  AST  24  /  ALT  44  /  AlkPhos  97  01-06    PT/INR - ( 2019 13:37 )   PT: 13.2 sec;   INR: 1.18 ratio           CARDIAC MARKERS ( 2019 16:37 )  0.058 ng/mL / x     / x     / x     / x      CARDIAC MARKERS ( 2019 13:37 )  0.070 ng/mL / x     / x     / x     / x          Urinalysis Basic - ( 2019 16:56 )    Color: Yellow / Appearance: Clear / S.010 / pH: x  Gluc: x / Ketone: Negative  / Bili: Negative / Urobili: Negative mg/dL   Blood: x / Protein: 15 mg/dL / Nitrite: Negative   Leuk Esterase: Small / RBC: 0-2 /HPF / WBC 11-25   Sq Epi: x / Non Sq Epi: Few / Bacteria: Moderate      Current medications:  ALPRAZolam 0.25 milliGRAM(s) Oral at bedtime PRN  aspirin enteric coated 81 milliGRAM(s) Oral daily  atorvastatin 40 milliGRAM(s) Oral at bedtime  carvedilol 12.5 milliGRAM(s) Oral every 12 hours  cholecalciferol 1000 Unit(s) Oral daily  donepezil 5 milliGRAM(s) Oral at bedtime  ferrous    sulfate 325 milliGRAM(s) Oral daily  furosemide   Injectable 40 milliGRAM(s) IV Push daily  heparin  Injectable 5000 Unit(s) SubCutaneous every 12 hours  levothyroxine 50 MICROGram(s) Oral daily  memantine 10 milliGRAM(s) Oral two times a day  multivitamin/minerals 1 Tablet(s) Oral daily  tamsulosin 0.4 milliGRAM(s) Oral at bedtime

## 2019-01-07 NOTE — CONSULT NOTE ADULT - ASSESSMENT
Acute on chronic systolic/diastolic congestive heart failure.  Coronary artery disease status post CABG 2003.  Aortic stenosis status post aortic valve replacement 2003.  Hypertension.  Persistent atrial fibrillation.  Sick sinus syndrome status post permanent pacemaker placement in the past.  Chronic renal insufficiency.  Dementia.  Anemia.  Renal lesion on CT scan.  Suggest  Continue with her cardiac medications including diuretics.  Continue with intake and output/daily weights.  Follow-up with electrolytes.  Patient is not on ACE inhibitor/ARB since he becomes dizzy .  He is not on any long-term anticoagulation due to recurrent falls, dementia, bruising and bleeding in the past. I have had lengthy discussion with patient's family regarding the situation.   Gradual ambulation.  Follow-up with H&H.  Check pacemaker.  Further evaluation of renal lesion if family agrees.

## 2019-01-07 NOTE — DIETITIAN INITIAL EVALUATION ADULT. - PERTINENT MEDS FT
MEDICATIONS  (STANDING):  aspirin enteric coated 81 milliGRAM(s) Oral daily  atorvastatin 40 milliGRAM(s) Oral at bedtime  carvedilol 12.5 milliGRAM(s) Oral every 12 hours  cholecalciferol 1000 Unit(s) Oral daily  donepezil 5 milliGRAM(s) Oral at bedtime  ferrous    sulfate 325 milliGRAM(s) Oral daily  furosemide   Injectable 40 milliGRAM(s) IV Push daily  heparin  Injectable 5000 Unit(s) SubCutaneous every 12 hours  levothyroxine 50 MICROGram(s) Oral daily  memantine 10 milliGRAM(s) Oral two times a day  multivitamin/minerals 1 Tablet(s) Oral daily  tamsulosin 0.4 milliGRAM(s) Oral at bedtime    MEDICATIONS  (PRN):  ALPRAZolam 0.25 milliGRAM(s) Oral at bedtime PRN anxiety

## 2019-01-07 NOTE — DIETITIAN INITIAL EVALUATION ADULT. - OTHER INFO
received consult for CHF; 89yo male with PMH of CAD s/p CABG, CHF, Afib, [place] p/w worsening SOB x few days 2/2 acute on chronic CHF with b/l pleural effusions.  Upon visit, pt appears well nourished.  Pt endorses no change in wt, however, than reports normal wt of 190#, which is 20# above admit wt.  Pt states he hasn't weighed himself in a very long time.  Again, denies wt loss.  Diet recall reveals that pt is not following low sodium diet and possibly  not meeting nutr needs, however, unable to accurately determine as pt is vague with answers.  no edema noted.  BM (+) 1/5.  urbano score of 16 with stage 1 on coccyx.  Patient currently does not meet criteria for malnutrition, however, is at high risk for malnutrition.  provided pt with CHF diet education and encouraged daily wt checks.  pt verbalized understanding.  RECOMMENDATIONS: 1) add gelatein once daily 2) encourage therapeutic diet compliance 3) daily wt checks 4) monitor PO intake/tolerance closely

## 2019-01-07 NOTE — PROGRESS NOTE ADULT - ASSESSMENT
89 yo male with Hx of CAD s/p CABG, chronic diastolic CHF,  Atrial fib not on AC because of bruising and bleeding, s/p PPM, [place]  who presented in the ER for worsening SOB for the past few days.  Patient admitted for CHF exacerbation.      1.  Acute on chronic Diastolic congestive heart failure:    Cont diuresis.    CXR on admission with mild CHF  Cont IV lasix 40 IV daily.    Follow labs.    F/u ECHO.    Cardiology eval.      2.  CAD s/p CABG:    Mild positive trop.  Suspect more demand ischemia.    Cont medical management.      3.  Renal Lesion:    Check renal US to further eval.      4.  CKD:    Cr 1.4.  Unclear baseline.  Follow.      5.  BPH:    Cont flomax.      6.  AFIB:    Cont home meds.      7.  DVT Proph:    Heparin SQ.

## 2019-01-07 NOTE — DIETITIAN INITIAL EVALUATION ADULT. - EST PROTEIN NEEDS10
PLEASE RETURN TO THE ED IF NOTED TO HAVE REDNESS/SWELLING/FEVER>100 AT THE SITE OF THE PICC REMOVAL. THE PICC WAS NOT SENT FOR CULTURE   123.2

## 2019-01-07 NOTE — CONSULT NOTE ADULT - ASSESSMENT
The patient is a 89 yo male with Hx. of CAD s/p CABG, CHF,  Atrial fib not on AC, becasue of bruising and bleeding, s/p PPM, [place]  who pressented in the ER for worsening SOB for the past few days.    pt is well known with prior thoracentesis done for eval his effusions in the past  now seating up in bed  seen and examined in his room  ct scan data discussed with him  no cp  no distress    Assessment / plan:  CHF with bilateral pleural effusions  MORENO due to above  Prior thoracentesis with transudative process  Basilar atelectasis due to above  no clinical evidence of pneumonia  Moderate size pleural effusion / with MORENO / no distress  given pt's advanced age and stability will defer on invasive procedure     medical management required  diuresis  monitor clinically and fu elevated creat  will discuss with cardiologist  DVT prophylaxis  thank you

## 2019-01-07 NOTE — CONSULT NOTE ADULT - SUBJECTIVE AND OBJECTIVE BOX
Patient is a 89y old  Male who presents with a chief complaint of Dyspnea .      HPI:  Patient is 89-year-old he has a history of hypertension, high cholesterol, coronary artery disease status post coronary artery bypass graft surgery and aortic valve replacement, he had a bioprosthetic valve placed in aortic position in , chronic atrial fibrillation, sick sinus syndrome status post permanent pacemaker placement the past, mild ischemic cardiomyopathy left ventricular ejection fraction 45-50%, chronic renal insufficiency, he was admitted with complains of increasing shortness of breath for the past 7-10 days prior to admission. After admission he was diagnosed to have acute diastolic/systolic congestive heart failure, he responded well to intravenous Lasix and now he feels improved. He denies any cough or any fever. He denies any orthopnea, PND or any ankle edema. He also suffers with dementia. He is alert but forgetful. He is not on long anticoagulation because of recurrent falls and an bruising.              PAST MEDICAL & SURGICAL HISTORY:  CHF (congestive heart failure)  Artificial pacemaker  S/P CABG (coronary artery bypass graft)          MEDICATIONS  (STANDING):  atorvastatin 40 milliGRAM(s) Oral at bedtime  carvedilol 12.5 milliGRAM(s) Oral every 12 hours  cholecalciferol 1000 Unit(s) Oral daily  donepezil 5 milliGRAM(s) Oral at bedtime  ferrous    sulfate 325 milliGRAM(s) Oral daily  heparin  Injectable 5000 Unit(s) SubCutaneous every 12 hours  levothyroxine 50 MICROGram(s) Oral daily  memantine 10 milliGRAM(s) Oral two times a day  multivitamin/minerals 1 Tablet(s) Oral daily  tamsulosin 0.4 milliGRAM(s) Oral at bedtime    MEDICATIONS  (PRN):  ALPRAZolam 0.25 milliGRAM(s) Oral at bedtime PRN anxiety      FAMILY HISTORY: Unremarkable.      SOCIAL HISTORY:  No history of smoking or any alcohol consumption.          REVIEW OF SYSTEM:  Pertinent items are noted in HPI.  Constitutional	Negative for chills, fevers, sweats.    Eyes: 	Negative for visual disturbance.  Ears, nose, mouth, throat, and face: Negative for epistaxis, nasal congestion, sore throat and tinnitus.  Neck:	Negative for enlargement, pain and difficulty in swallowing  Respiration : Negative for cough,  pleuritic chest pain and wheezing . He has dyspnea on exertion.  Cardiovascular: Negative for chest pain, and palpitations    Gastrointestinal : Negative for abdominal pain, diarrhea, nausea and vomiting  Genitourinary: Negative for dysuria, frequency and urinary incontinence .  Skin: Negative for  rash, pruritus, swelling, dryness .  	  Hematologic/lymphatic: Negative for bleeding and easy bruising  Musculoskeletal: Negative for arthralgias, back pain and muscle weakness.  Neurological: Negative for dizziness, headaches, seizures and tremors  Behavioral/Psych: Negative for mood change, depression.  Endocrine:	Negative for blurry vision, polydipsia and polyuria, diaphoresis.   Allergic/Immunologic:	Negative for anaphylaxis, angioedema and urticaria.      Vital Signs Last 24 Hrs  T(C): 36.9 (2019 05:07), Max: 36.9 (2019 05:07)  T(F): 98.4 (2019 05:07), Max: 98.4 (2019 05:07)  HR: 60 (2019 05:07) (60 - 63)  BP: 120/52 (2019 05:07) (120/52 - 166/94)  BP(mean): --  RR: 16 (2019 05:07) (16 - 17)  SpO2: 96% (2019 05:07) (96% - 100%)    I&O's Summary    2019 07:01  -  2019 07:00  --------------------------------------------------------  IN: 0 mL / OUT: 900 mL / NET: -900 mL      PHYSICAL EXAM  General Appearance: cooperative, no acute distress, elderly   HEENT: PERRL, conjunctiva clear, EOM's intact   Neck: Supple, , no adenopathy, thyroid: not enlarged, no carotid bruit or JVD  Lungs: diminished breath sounds on both sides scattered this allows no rhonchi's.  Heart: Irregular rate and rhythm, S1, S2 normal, 1/6 ES murmur, no  rub or gallop  Abdomen: Soft, non-tender, bowel sounds active , no hepatosplenomegaly  Extremities: no cyanosis 0 to 1 + ankle   edema, no joint swelling  Skin: Skin color, texture normal, no rashes   Neurologic: Alert and oriented but forgetful , cranial nerves intact, sensory and motor normal,        INTERPRETATION OF TELEMETRY: atrial fibrillation with electronic ventricular pacing.    ECG: Electronic ventricular pacing.        LABS:                          9.9    7.00  )-----------( 113      ( 2019 05:43 )             30.7     01-07    143  |  111<H>  |  36<H>  ----------------------------<  95  4.0   |  24  |  1.47<H>    Ca    8.6      2019 05:43    TPro  7.4  /  Alb  3.2<L>  /  TBili  0.9  /  DBili  x   /  AST  24  /  ALT  44  /  AlkPhos  97  -06    CARDIAC MARKERS ( 2019 16:37 )  0.058 ng/mL / x     / x     / x     / x      CARDIAC MARKERS ( 2019 13:37 )  0.070 ng/mL / x     / x     / x     / x            Pro BNP  8544  @ 13:37  D Dimer  --  @ 13:37    PT/INR - ( 2019 13:37 )   PT: 13.2 sec;   INR: 1.18 ratio           Urinalysis Basic - ( 2019 16:56 )    Color: Yellow / Appearance: Clear / S.010 / pH: x  Gluc: x / Ketone: Negative  / Bili: Negative / Urobili: Negative mg/dL   Blood: x / Protein: 15 mg/dL / Nitrite: Negative   Leuk Esterase: Small / RBC: 0-2 /HPF / WBC 11-25   Sq Epi: x / Non Sq Epi: Few / Bacteria: Moderate            RADIOLOGY & ADDITIONAL STUDIES: Patient is a 89y old  Male who presents with a chief complaint of Dyspnea .      HPI:  Patient is 89-year-old he has a history of hypertension, high cholesterol, coronary artery disease status post coronary artery bypass graft surgery and aortic valve replacement, he had a bioprosthetic valve placed in aortic position in , chronic atrial fibrillation, sick sinus syndrome status post permanent pacemaker placement the past, mild ischemic cardiomyopathy left ventricular ejection fraction 45-50%, chronic renal insufficiency, he was admitted with complains of increasing shortness of breath for the past 7-10 days prior to admission. After admission he was diagnosed to have acute diastolic/systolic congestive heart failure, he responded well to intravenous Lasix and now he feels improved. He denies any cough or any fever. He denies any orthopnea, PND or any ankle edema. He also suffers with dementia. He is alert but forgetful. He is not on long anticoagulation because of recurrent falls and an bruising.              PAST MEDICAL & SURGICAL HISTORY:  CHF (congestive heart failure)  Artificial pacemaker  S/P CABG (coronary artery bypass graft)          MEDICATIONS  (STANDING):  atorvastatin 40 milliGRAM(s) Oral at bedtime  carvedilol 12.5 milliGRAM(s) Oral every 12 hours  cholecalciferol 1000 Unit(s) Oral daily  donepezil 5 milliGRAM(s) Oral at bedtime  ferrous    sulfate 325 milliGRAM(s) Oral daily  heparin  Injectable 5000 Unit(s) SubCutaneous every 12 hours  levothyroxine 50 MICROGram(s) Oral daily  memantine 10 milliGRAM(s) Oral two times a day  multivitamin/minerals 1 Tablet(s) Oral daily  tamsulosin 0.4 milliGRAM(s) Oral at bedtime    MEDICATIONS  (PRN):  ALPRAZolam 0.25 milliGRAM(s) Oral at bedtime PRN anxiety      FAMILY HISTORY: Unremarkable.      SOCIAL HISTORY:  No history of smoking or any alcohol consumption.          REVIEW OF SYSTEM:  Pertinent items are noted in HPI.  Constitutional	Negative for chills, fevers, sweats.    Eyes: 	Negative for visual disturbance.  Ears, nose, mouth, throat, and face: Negative for epistaxis, nasal congestion, sore throat and tinnitus.  Neck:	Negative for enlargement, pain and difficulty in swallowing  Respiration : Negative for cough,  pleuritic chest pain and wheezing . He has dyspnea on exertion.  Cardiovascular: Negative for chest pain, and palpitations    Gastrointestinal : Negative for abdominal pain, diarrhea, nausea and vomiting  Genitourinary: Negative for dysuria, frequency and urinary incontinence .  Skin: Negative for  rash, pruritus, swelling, dryness .  	  Hematologic/lymphatic: Negative for bleeding and easy bruising  Neurological: Negative for dizziness, headaches, seizures and tremors      Allergic/Immunologic:	Negative for anaphylaxis, angioedema and urticaria.      Vital Signs Last 24 Hrs  T(C): 36.9 (2019 05:07), Max: 36.9 (2019 05:07)  T(F): 98.4 (2019 05:07), Max: 98.4 (2019 05:07)  HR: 60 (2019 05:07) (60 - 63)  BP: 120/52 (2019 05:07) (120/52 - 166/94)  BP(mean): --  RR: 16 (2019 05:07) (16 - 17)  SpO2: 96% (2019 05:07) (96% - 100%)    I&O's Summary    2019 07:01  -  2019 07:00  --------------------------------------------------------  IN: 0 mL / OUT: 900 mL / NET: -900 mL      PHYSICAL EXAM  General Appearance: cooperative, no acute distress, elderly   HEENT: PERRL, conjunctiva clear, EOM's intact   Neck: Supple, , no adenopathy, thyroid: not enlarged, no carotid bruit or JVD  Lungs: diminished breath sounds on both sides scattered this allows no rhonchi's.  Heart: Irregular rate and rhythm, S1, S2 normal, 1/6 ES murmur, no  rub or gallop  Abdomen: Soft, non-tender, bowel sounds active , no hepatosplenomegaly  Extremities: no cyanosis 0 to 1 + ankle   edema, no joint swelling  Skin: Skin color, texture normal, no rashes   Neurologic: Alert and oriented but forgetful , cranial nerves intact, sensory and motor normal,        INTERPRETATION OF TELEMETRY: atrial fibrillation with electronic ventricular pacing.    ECG: Electronic ventricular pacing.        LABS:                          9.9    7.00  )-----------( 113      ( 2019 05:43 )             30.7         143  |  111<H>  |  36<H>  ----------------------------<  95  4.0   |  24  |  1.47<H>    Ca    8.6      2019 05:43    TPro  7.4  /  Alb  3.2<L>  /  TBili  0.9  /  DBili  x   /  AST  24  /  ALT  44  /  AlkPhos  97      CARDIAC MARKERS ( 2019 16:37 )  0.058 ng/mL / x     / x     / x     / x      CARDIAC MARKERS ( 2019 13:37 )  0.070 ng/mL / x     / x     / x     / x            Pro BNP  8544  @ 13:37  D Dimer  --  @ 13:37    PT/INR - ( 2019 13:37 )   PT: 13.2 sec;   INR: 1.18 ratio           Urinalysis Basic - ( 2019 16:56 )    Color: Yellow / Appearance: Clear / S.010 / pH: x  Gluc: x / Ketone: Negative  / Bili: Negative / Urobili: Negative mg/dL   Blood: x / Protein: 15 mg/dL / Nitrite: Negative   Leuk Esterase: Small / RBC: 0-2 /HPF / WBC 11-25   Sq Epi: x / Non Sq Epi: Few / Bacteria: Moderate            RADIOLOGY & ADDITIONAL STUDIES:

## 2019-01-08 ENCOUNTER — TRANSCRIPTION ENCOUNTER (OUTPATIENT)
Age: 84
End: 2019-01-08

## 2019-01-08 VITALS — WEIGHT: 159.39 LBS

## 2019-01-08 LAB
ANION GAP SERPL CALC-SCNC: 7 MMOL/L — SIGNIFICANT CHANGE UP (ref 5–17)
BUN SERPL-MCNC: 49 MG/DL — HIGH (ref 7–23)
CALCIUM SERPL-MCNC: 8.6 MG/DL — SIGNIFICANT CHANGE UP (ref 8.5–10.1)
CHLORIDE SERPL-SCNC: 110 MMOL/L — HIGH (ref 96–108)
CO2 SERPL-SCNC: 25 MMOL/L — SIGNIFICANT CHANGE UP (ref 22–31)
CREAT SERPL-MCNC: 1.68 MG/DL — HIGH (ref 0.5–1.3)
CULTURE RESULTS: SIGNIFICANT CHANGE UP
GLUCOSE SERPL-MCNC: 94 MG/DL — SIGNIFICANT CHANGE UP (ref 70–99)
HCT VFR BLD CALC: 30.6 % — LOW (ref 39–50)
HGB BLD-MCNC: 10 G/DL — LOW (ref 13–17)
MCHC RBC-ENTMCNC: 31.5 PG — SIGNIFICANT CHANGE UP (ref 27–34)
MCHC RBC-ENTMCNC: 32.7 GM/DL — SIGNIFICANT CHANGE UP (ref 32–36)
MCV RBC AUTO: 96.5 FL — SIGNIFICANT CHANGE UP (ref 80–100)
NRBC # BLD: 0 /100 WBCS — SIGNIFICANT CHANGE UP (ref 0–0)
PLATELET # BLD AUTO: 126 K/UL — LOW (ref 150–400)
POTASSIUM SERPL-MCNC: 3.8 MMOL/L — SIGNIFICANT CHANGE UP (ref 3.5–5.3)
POTASSIUM SERPL-SCNC: 3.8 MMOL/L — SIGNIFICANT CHANGE UP (ref 3.5–5.3)
RBC # BLD: 3.17 M/UL — LOW (ref 4.2–5.8)
RBC # FLD: 14.3 % — SIGNIFICANT CHANGE UP (ref 10.3–14.5)
SODIUM SERPL-SCNC: 142 MMOL/L — SIGNIFICANT CHANGE UP (ref 135–145)
SPECIMEN SOURCE: SIGNIFICANT CHANGE UP
WBC # BLD: 6.23 K/UL — SIGNIFICANT CHANGE UP (ref 3.8–10.5)
WBC # FLD AUTO: 6.23 K/UL — SIGNIFICANT CHANGE UP (ref 3.8–10.5)

## 2019-01-08 RX ORDER — ASPIRIN/CALCIUM CARB/MAGNESIUM 324 MG
1 TABLET ORAL
Qty: 0 | Refills: 0 | DISCHARGE
Start: 2019-01-08

## 2019-01-08 RX ORDER — FUROSEMIDE 40 MG
40 TABLET ORAL DAILY
Qty: 0 | Refills: 0 | Status: DISCONTINUED | OUTPATIENT
Start: 2019-01-08 | End: 2019-01-08

## 2019-01-08 RX ADMIN — MEMANTINE HYDROCHLORIDE 10 MILLIGRAM(S): 10 TABLET ORAL at 05:25

## 2019-01-08 RX ADMIN — Medication 325 MILLIGRAM(S): at 14:02

## 2019-01-08 RX ADMIN — Medication 1000 UNIT(S): at 14:02

## 2019-01-08 RX ADMIN — CARVEDILOL PHOSPHATE 12.5 MILLIGRAM(S): 80 CAPSULE, EXTENDED RELEASE ORAL at 05:27

## 2019-01-08 RX ADMIN — Medication 1 TABLET(S): at 14:01

## 2019-01-08 RX ADMIN — HEPARIN SODIUM 5000 UNIT(S): 5000 INJECTION INTRAVENOUS; SUBCUTANEOUS at 05:25

## 2019-01-08 RX ADMIN — Medication 50 MICROGRAM(S): at 05:26

## 2019-01-08 RX ADMIN — Medication 40 MILLIGRAM(S): at 14:02

## 2019-01-08 RX ADMIN — Medication 81 MILLIGRAM(S): at 14:02

## 2019-01-08 NOTE — PROGRESS NOTE ADULT - ASSESSMENT
The patient is a 89 yo male with Hx. of CAD s/p CABG, CHF,  Atrial fib not on AC, becasue of bruising and bleeding, s/p PPM, [place]  who pressented in the ER for worsening SOB for the past few days.    pt is well known with prior thoracentesis done for eval his effusions in the past  now seating up in bed  seen and examined in his room  ct scan data discussed with him  no cp  no distress    Assessment / plan:  CHF with bilateral pleural effusions  MORENO due to above  Prior thoracentesis with transudative process  Basilar atelectasis due to above  no clinical evidence of pneumonia  Moderate size pleural effusion / with MORENO / no distress  given pt's advanced age and stability will defer on invasive procedure   left kidney complex cyst / mass    medical management required  diuresis  monitor clinically and fu elevated creat  will discuss with cardiologist  DVT prophylaxis  workup for kidney mass as per primary team   thank you

## 2019-01-08 NOTE — DISCHARGE NOTE ADULT - CARE PLAN
Principal Discharge DX:	Acute on chronic systolic congestive heart failure  Goal:	improve breathing  Assessment and plan of treatment:	continue home meds and follow up with cardiology  Secondary Diagnosis:	Renal mass  Goal:	follow up with PMD

## 2019-01-08 NOTE — DISCHARGE NOTE ADULT - CARE PROVIDER_API CALL
Shamar Sandoval), Internal Medicine  [address] [place] [address]  Phone: [phone]  Fax: [phone]    Brendan Colmenares (MD), Cardiovascular Disease; Internal Medicine  [address] [place] [address]  Phone: [phone]  Fax: [phone]    JOSE ARMANDO Cartwright (), Pulmonary Disease; Sleep Medicine  [address] [place] [address]  Phone: [phone]  Fax: [phone]

## 2019-01-08 NOTE — DISCHARGE NOTE ADULT - HOSPITAL COURSE
89 yo male with Hx of CAD s/p CABG, chronic diastolic CHF,  Atrial fib not on AC because of bruising and bleeding, s/p PPM, [place]  who presented in the ER for worsening SOB for the past few days.  Patient admitted for CHF exacerbation.      Hospital course signficant for IV diuresis with lasix.    Also findings of renal mass on CT/ US.  New finding.      Vital Signs Last 24 Hrs  T(C): 36.7 (08 Jan 2019 10:35), Max: 36.7 (07 Jan 2019 17:47)  T(F): 98 (08 Jan 2019 10:35), Max: 98 (07 Jan 2019 17:47)  HR: 60 (08 Jan 2019 10:35) (60 - 60)  BP: 110/49 (08 Jan 2019 10:35) (110/49 - 159/73)  BP(mean): --  RR: 18 (08 Jan 2019 10:35) (18 - 18)  SpO2: 98% (08 Jan 2019 10:35) (95% - 100%)    ROS:   All 10 systems reviewed and found to be negative with the exception of what has been described above.    PHYSICAL EXAM:  GENERAL: NAD  NERVOUS SYSTEM:  Alert & Oriented X3, non- focal exam, Motor Strength 5/5 B/L upper and lower extremities; DTRs 2+ intact and symmetric  HEAD:  Atraumatic, Normocephalic  EYES: EOMI, PERRLA, conjunctiva and sclera clear  HEENT: Moist mucous membranes  NECK: Supple, No JVD  CHEST/LUNG: decreased BS  HEART: Regular rate and rhythm; No murmurs, rubs, or gallops  ABDOMEN: Soft, Nontender, Nondistended; Bowel sounds present  GENITOURINARY- Voiding, no suprapubic tenderness  EXTREMITIES:  2+ Peripheral Pulses, No clubbing, cyanosis, or edema  MUSCULOSKELETAL:- No muscle tenderness, Muscle tone normal, No joint tenderness, no Joint swelling, Joint range of motion-normal  SKIN-no rash, no lesion    01-08    142  |  110<H>  |  49<H>  ----------------------------<  94  3.8   |  25  |  1.68<H>    Ca    8.6      08 Jan 2019 05:41                              10.0   6.23  )-----------( 126      ( 08 Jan 2019 05:41 )             30.6       CARDIAC MARKERS ( 06 Jan 2019 16:37 )  0.058 ng/mL / x     / x     / x     / x        PLAN:    1.  Acute on chronic Diastolic congestive heart failure:    S/p Iv diuresis.    Change back to PO lasix.    Cardio f/u outpatient.    EF 55%.      2.  CAD s/p CABG:    Mild positive trop.  Suspect more demand ischemia.    Cont medical management.    Resume home meds.  ASA.      3.  Renal Lesion:    D/w daughter/ patient.    F/u outpt.    Cannot r/o malignant lesion.      4.  CKD:    Cr 1.4.  Unclear baseline.  Follow.      5.  BPH:    Cont flomax.      6.  AFIB:    Cont home meds.      DISPO:    Stable for d/c home with home care.    D/w daughter.    Total time spent 45m in.

## 2019-01-08 NOTE — DISCHARGE NOTE ADULT - PATIENT PORTAL LINK FT
You can access the [facility], offered by [facility], by registering with the following website: [email or web address]

## 2019-01-08 NOTE — PROGRESS NOTE ADULT - ASSESSMENT
Acute on chronic systolic/diastolic congestive heart failure. He is better today.  Coronary artery disease status post CABG 2003.  Aortic stenosis status post aortic valve replacement 2003.  Hypertension.  Persistent atrial fibrillation.  Sick sinus syndrome status post permanent pacemaker placement in the past.  Chronic renal insufficiency.  Dementia.  Anemia.  Renal lesion on CT scan.  Suggest  Start PO Lasix.  Continue with intake and output/daily weights.  Follow-up with electrolytes.  He is not on any long-term anticoagulation due to recurrent falls, dementia, bruising and bleeding in the past. I have had lengthy discussion with patient's family regarding the situation.   Gradual ambulation. Acute on chronic systolic/diastolic congestive heart failure. He is better today.  Coronary artery disease status post CABG 2003.  Aortic stenosis status post aortic valve replacement 2003.  Hypertension.  Persistent atrial fibrillation.  Sick sinus syndrome status post permanent pacemaker placement in the past.  Chronic renal insufficiency.  Dementia.  Anemia.  Renal lesion on CT scan.  Suggest  Start PO Lasix.  Continue with intake and output/daily weights.  Follow-up with electrolytes.  He is not on any long-term anticoagulation due to recurrent falls, dementia, bruising and bleeding in the past. I have had lengthy discussion with patient's family regarding the situation.   Gradual ambulation.  Discussed with patient's daughter.

## 2019-01-08 NOTE — DISCHARGE NOTE ADULT - MEDICATION SUMMARY - MEDICATIONS TO TAKE
I will START or STAY ON the medications listed below when I get home from the hospital:    aspirin 81 mg oral delayed release tablet  -- 1 tab(s) by mouth once a day  -- Indication: For Blood thinner    Flomax 0.4 mg oral capsule  -- 1 cap(s) by mouth once a day (at bedtime)  -- Indication: For prostate    atorvastatin 40 mg oral tablet  -- 1 tab(s) by mouth once a day (at bedtime)  -- Indication: For CHolesterol    ALPRAZolam 0.25 mg oral tablet  -- 1 tab(s) by mouth once a day (at bedtime), As Needed    **Last picked up 04/26/2018 x 60 tabs for 30 days, uses PRN for sleep**  -- Indication: For Anxiety    carvedilol 12.5 mg oral tablet  -- 1 tab(s) by mouth 2 times a day  -- Indication: For heart    donepezil 5 mg oral tablet  -- 1 tab(s) by mouth once a day (at bedtime)  -- Indication: For memory    furosemide 40 mg oral tablet  -- 1 tab(s) by mouth once a day  -- Indication: For diuretic    ferrous sulfate 325 mg (65 mg elemental iron) oral tablet  -- 1 tab(s) by mouth every other day  -- Indication: For iron    memantine 10 mg oral tablet  -- 1 tab(s) by mouth 2 times a day  -- Indication: For vitamin    levothyroxine 50 mcg (0.05 mg) oral tablet  -- 1 tab(s) by mouth once a day  -- Indication: For thyroid    PreserVision AREDS 2 oral capsule  -- 1 cap(s) by mouth once a day  -- Indication: For vitamin    Vitamin D3  -- 1 tab(s) by mouth once a day  -- Indication: For vitamin

## 2019-01-08 NOTE — PROGRESS NOTE ADULT - SUBJECTIVE AND OBJECTIVE BOX
HPI:  Patient is 89-year-old he has a history of hypertension, high cholesterol, coronary artery disease status post coronary artery bypass graft surgery and aortic valve replacement, he had a bioprosthetic valve placed in aortic position in , chronic atrial fibrillation, sick sinus syndrome status post permanent pacemaker placement the past, mild ischemic cardiomyopathy left ventricular ejection fraction 45-50%, chronic renal insufficiency, he was admitted with complains of increasing shortness of breath for the past 7-10 days prior to admission. After admission he was diagnosed to have acute diastolic/systolic congestive heart failure, he responded well to intravenous Lasix and now he feels improved. He denies any cough or any fever. He denies any orthopnea, PND or any ankle edema. He also suffers with dementia. He is alert but forgetful. He is now comfortable  and is having his breakfast this morning and is in no acute distress.              PAST MEDICAL & SURGICAL HISTORY:  CHF (congestive heart failure)  Artificial pacemaker  S/P CABG (coronary artery bypass graft)              MEDICATIONS  (STANDING):  aspirin enteric coated 81 milliGRAM(s) Oral daily  atorvastatin 40 milliGRAM(s) Oral at bedtime  carvedilol 12.5 milliGRAM(s) Oral every 12 hours  cholecalciferol 1000 Unit(s) Oral daily  donepezil 5 milliGRAM(s) Oral at bedtime  ferrous    sulfate 325 milliGRAM(s) Oral daily  furosemide    Tablet 40 milliGRAM(s) Oral daily  heparin  Injectable 5000 Unit(s) SubCutaneous every 12 hours  levothyroxine 50 MICROGram(s) Oral daily  memantine 10 milliGRAM(s) Oral two times a day  multivitamin/minerals 1 Tablet(s) Oral daily  tamsulosin 0.4 milliGRAM(s) Oral at bedtime    MEDICATIONS  (PRN):  ALPRAZolam 0.25 milliGRAM(s) Oral at bedtime PRN anxiety      REVIEW OF SYSTEM:  Pertinent items are noted in HPI.  Constitutional	Negative for chills, fevers, sweats.    Eyes: 	Negative for visual disturbance.  Ears, nose, mouth, throat, and face: Negative for epistaxis, nasal congestion, sore throat and tinnitus.  Neck:	Negative for enlargement, pain and difficulty in swallowing  Respiration : Negative for cough, pleuritic chest pain and wheezing  Cardiovascular: Negative for chest pain,  and palpitations    Gastrointestinal : Negative for abdominal pain, diarrhea, nausea and vomiting  Genitourinary: Negative for dysuria, frequency and urinary incontinence .  Skin: Negative for  rash, pruritus, swelling, dryness .  	  Hematologic/lymphatic: Negative for bleeding and easy bruising  Musculoskeletal: Negative for arthralgias, back pain and muscle weakness.  Neurological: Negative for dizziness, headaches, seizures and tremors  Behavioral/Psych: Negative for mood change, depression.  Endocrine:	Negative for blurry vision, polydipsia and polyuria, diaphoresis.   Allergic/Immunologic:	Negative for anaphylaxis, angioedema and urticaria.      Vital Signs Last 24 Hrs  T(C): 36.6 (2019 05:03), Max: 36.7 (2019 17:47)  T(F): 97.8 (2019 05:03), Max: 98 (2019 17:47)  HR: 60 (2019 05:03) (60 - 60)  BP: 137/57 (2019 05:03) (137/57 - 159/73)  BP(mean): --  RR: 18 (2019 17:47) (18 - 18)  SpO2: 95% (2019 05:03) (95% - 100%)    I&O's Summary    2019 07:01  -  2019 07:00  --------------------------------------------------------  IN: 0 mL / OUT: 580 mL / NET: -580 mL      PHYSICAL EXAM  General Appearance: cooperative, no acute distress,   HEENT: PERRL, conjunctiva clear, EOM's intact .   Neck: Supple, , no adenopathy, thyroid: not enlarged, no carotid bruit or JVD    Lungs: Clear to auscultation bilateral,no adventitious breath sounds, normal   expiratory phase  Heart: Regular rate and rhythm, S1, S2 normal, 2/6 ESM murmur, rub or gallop  Abdomen: Soft, non-tender, bowel sounds active , no hepatosplenomegaly  Extremities: no cyanosis or edema, no joint swelling  Skin: Skin color, texture normal, no rashes   Neurologic: Alert and oriented X3 , cranial nerves intact, sensory and motor normal,        INTERPRETATION OF TELEMETRY: Electronic ventricular pacing.          LABS:                          10.0   6.23  )-----------( 126      ( 2019 05:41 )             30.6     01-08    142  |  110<H>  |  49<H>  ----------------------------<  94  3.8   |  25  |  1.68<H>    Ca    8.6      2019 05:41    TPro  7.4  /  Alb  3.2<L>  /  TBili  0.9  /  DBili  x   /  AST  24  /  ALT  44  /  AlkPhos  97  -    CARDIAC MARKERS ( 2019 16:37 )  0.058 ng/mL / x     / x     / x     / x      CARDIAC MARKERS ( 2019 13:37 )  0.070 ng/mL / x     / x     / x     / x            Pro BNP  8544  @ 13:37  D Dimer  --  @ 13:37    PT/INR - ( 2019 13:37 )   PT: 13.2 sec;   INR: 1.18 ratio           Urinalysis Basic - ( 2019 16:56 )    Color: Yellow / Appearance: Clear / S.010 / pH: x  Gluc: x / Ketone: Negative  / Bili: Negative / Urobili: Negative mg/dL   Blood: x / Protein: 15 mg/dL / Nitrite: Negative   Leuk Esterase: Small / RBC: 0-2 /HPF / WBC 11-25   Sq Epi: x / Non Sq Epi: Few / Bacteria: Moderate            RADIOLOGY & ADDITIONAL STUDIES:        MEDICATIONS  (STANDING):  aspirin enteric coated 81 milliGRAM(s) Oral daily  atorvastatin 40 milliGRAM(s) Oral at bedtime  carvedilol 12.5 milliGRAM(s) Oral every 12 hours  cholecalciferol 1000 Unit(s) Oral daily  donepezil 5 milliGRAM(s) Oral at bedtime  ferrous    sulfate 325 milliGRAM(s) Oral daily  furosemide    Tablet 40 milliGRAM(s) Oral daily  heparin  Injectable 5000 Unit(s) SubCutaneous every 12 hours  levothyroxine 50 MICROGram(s) Oral daily  memantine 10 milliGRAM(s) Oral two times a day  multivitamin/minerals 1 Tablet(s) Oral daily  tamsulosin 0.4 milliGRAM(s) Oral at bedtime    MEDICATIONS  (PRN):  ALPRAZolam 0.25 milliGRAM(s) Oral at bedtime PRN anxiety      FAMILY HISTORY:      SOCIAL HISTORY:  ***    REVIEW OF SYSTEM:  Pertinent items are noted in HPI.  Constitutional	Negative for chills, fevers, sweats.    Eyes: 	Negative for visual disturbance.  Ears, nose, mouth, throat, and face: Negative for epistaxis, nasal congestion, sore throat and tinnitus.  Neck:	Negative for enlargement, pain and difficulty in swallowing  Respiration : Negative for cough, dyspnea on exertion, pleuritic chest pain and wheezing  Cardiovascular: Negative for chest pain, dyspnea and palpitations    Gastrointestinal : Negative for abdominal pain, diarrhea, nausea and vomiting  Genitourinary: Negative for dysuria, frequency and urinary incontinence .  Skin: Negative for  rash, pruritus, swelling, dryness .  	  Hematologic/lymphatic: Negative for bleeding and easy bruising  Musculoskeletal: Negative for arthralgias, back pain and muscle weakness.  Neurological: Negative for dizziness, headaches, seizures and tremors  Behavioral/Psych: Negative for mood change, depression.  Endocrine:	Negative for blurry vision, polydipsia and polyuria, diaphoresis.   Allergic/Immunologic:	Negative for anaphylaxis, angioedema and urticaria.      Vital Signs Last 24 Hrs  T(C): 36.6 (2019 05:03), Max: 36.7 (2019 17:47)  T(F): 97.8 (2019 05:03), Max: 98 (2019 17:47)  HR: 60 (2019 05:03) (60 - 60)  BP: 137/57 (2019 05:03) (137/57 - 159/73)  BP(mean): --  RR: 18 (2019 17:47) (18 - 18)  SpO2: 95% (2019 05:03) (95% - 100%)    I&O's Summary    2019 07:01  -  2019 07:00  --------------------------------------------------------  IN: 0 mL / OUT: 580 mL / NET: -580 mL      PHYSICAL EXAM  General Appearance: cooperative, no acute distress,   HEENT: PERRL, conjunctiva clear, EOM's intact, non injected pharynx, no exudate, TM   normal  Neck: Supple, , no adenopathy, thyroid: not enlarged, no carotid bruit or JVD  Back: Symmetric, no  tenderness,no soft tissue tenderness  Lungs: Clear to auscultation bilateral,no adventitious breath sounds, normal   expiratory phase  Heart: Regular rate and rhythm, S1, S2 normal, no murmur, rub or gallop  Abdomen: Soft, non-tender, bowel sounds active , no hepatosplenomegaly  Extremities: no cyanosis or edema, no joint swelling  Skin: Skin color, texture normal, no rashes   Neurologic: Alert and oriented X3 , cranial nerves intact, sensory and motor normal,        INTERPRETATION OF TELEMETRY:    ECG:        LABS:                          10.0   6.23  )-----------( 126      ( 2019 05:41 )             30.6     01-    142  |  110<H>  |  49<H>  ----------------------------<  94  3.8   |  25  |  1.68<H>    Ca    8.6      2019 05:41    TPro  7.4  /  Alb  3.2<L>  /  TBili  0.9  /  DBili  x   /  AST  24  /  ALT  44  /  AlkPhos  97  01-06    CARDIAC MARKERS ( 2019 16:37 )  0.058 ng/mL / x     / x     / x     / x      CARDIAC MARKERS ( 2019 13:37 )  0.070 ng/mL / x     / x     / x     / x            Pro BNP  8544  @ 13:37  D Dimer  --  @ 13:37    PT/INR - ( 2019 13:37 )   PT: 13.2 sec;   INR: 1.18 ratio           Urinalysis Basic - ( 2019 16:56 )    Color: Yellow / Appearance: Clear / S.010 / pH: x  Gluc: x / Ketone: Negative  / Bili: Negative / Urobili: Negative mg/dL   Blood: x / Protein: 15 mg/dL / Nitrite: Negative   Leuk Esterase: Small / RBC: 0-2 /HPF / WBC 11-25   Sq Epi: x / Non Sq Epi: Few / Bacteria: Moderate            RADIOLOGY & ADDITIONAL STUDIES: HPI:  Patient is 89-year-old he has a history of hypertension, high cholesterol, coronary artery disease status post coronary artery bypass graft surgery and aortic valve replacement, he had a bioprosthetic valve placed in aortic position in , chronic atrial fibrillation, sick sinus syndrome status post permanent pacemaker placement the past, mild ischemic cardiomyopathy left ventricular ejection fraction 45-50%, chronic renal insufficiency, he was admitted with complains of increasing shortness of breath for the past 7-10 days prior to admission. After admission he was diagnosed to have acute diastolic/systolic congestive heart failure, he responded well to intravenous Lasix and now he feels improved. He denies any cough or any fever. He denies any orthopnea, PND or any ankle edema. He also suffers with dementia. He is alert but forgetful. He is now comfortable  and is having his breakfast this morning and is in no acute distress.         Transthoracic Echocardiogram Follow Up (19    Summary     Fibrocalcific changes noted to the mitral valve leaflets with preserved   leaflet excursion.   Moderate mitral annular calcification is present.   Mild (1+) mitral regurgitation is present.     Moderate (2+) tricuspid valve regurgitation is present.   Normal appearing pulmonic valve structure and function.   The left atrium is moderately dilated.   Moderate concentric left ventricular hypertrophy is present.   Estimated left ventricular ejection fraction is 55 %.   The right atrium appears moderately dilated.   A device wire is seen in the RV and RA.   No evidence of pericardial effusion.   Pleural effusion cannot be ruled out.   Bioprosthetic aortic valve leaflets are thickened.   Moderate stenosis noted   Mild regurgitation.          PAST MEDICAL & SURGICAL HISTORY:  CHF (congestive heart failure)  Artificial pacemaker  S/P CABG (coronary artery bypass graft)              MEDICATIONS  (STANDING):  aspirin enteric coated 81 milliGRAM(s) Oral daily  atorvastatin 40 milliGRAM(s) Oral at bedtime  carvedilol 12.5 milliGRAM(s) Oral every 12 hours  cholecalciferol 1000 Unit(s) Oral daily  donepezil 5 milliGRAM(s) Oral at bedtime  ferrous    sulfate 325 milliGRAM(s) Oral daily  furosemide    Tablet 40 milliGRAM(s) Oral daily  heparin  Injectable 5000 Unit(s) SubCutaneous every 12 hours  levothyroxine 50 MICROGram(s) Oral daily  memantine 10 milliGRAM(s) Oral two times a day  multivitamin/minerals 1 Tablet(s) Oral daily  tamsulosin 0.4 milliGRAM(s) Oral at bedtime    MEDICATIONS  (PRN):  ALPRAZolam 0.25 milliGRAM(s) Oral at bedtime PRN anxiety      REVIEW OF SYSTEM:  Pertinent items are noted in HPI.  Constitutional	Negative for chills, fevers, sweats.    Eyes: 	Negative for visual disturbance.  Ears, nose, mouth, throat, and face: Negative for epistaxis, nasal congestion, sore throat and tinnitus.  Neck:	Negative for enlargement, pain and difficulty in swallowing  Respiration : Negative for cough, pleuritic chest pain and wheezing  Cardiovascular: Negative for chest pain,  and palpitations    Gastrointestinal : Negative for abdominal pain, diarrhea, nausea and vomiting  Genitourinary: Negative for dysuria, frequency and urinary incontinence .  Skin: Negative for  rash, pruritus, swelling, dryness .  	  Hematologic/lymphatic: Negative for bleeding and easy bruising  Musculoskeletal: Negative for arthralgias, back pain and muscle weakness.  Neurological: Negative for dizziness, headaches, seizures and tremors  Behavioral/Psych: Negative for mood change, depression.  Endocrine:	Negative for blurry vision, polydipsia and polyuria, diaphoresis.   Allergic/Immunologic:	Negative for anaphylaxis, angioedema and urticaria.      Vital Signs Last 24 Hrs  T(C): 36.6 (2019 05:03), Max: 36.7 (2019 17:47)  T(F): 97.8 (2019 05:03), Max: 98 (2019 17:47)  HR: 60 (2019 05:03) (60 - 60)  BP: 137/57 (2019 05:03) (137/57 - 159/73)  BP(mean): --  RR: 18 (2019 17:47) (18 - 18)  SpO2: 95% (2019 05:03) (95% - 100%)    I&O's Summary    2019 07:01  -  2019 07:00  --------------------------------------------------------  IN: 0 mL / OUT: 580 mL / NET: -580 mL      PHYSICAL EXAM  General Appearance: cooperative, no acute distress,   HEENT: PERRL, conjunctiva clear, EOM's intact .   Neck: Supple, , no adenopathy, thyroid: not enlarged, no carotid bruit or JVD    Lungs: Clear to auscultation bilateral,no adventitious breath sounds, normal   expiratory phase  Heart: Regular rate and rhythm, S1, S2 normal, 2/6 ESM murmur, rub or gallop  Abdomen: Soft, non-tender, bowel sounds active , no hepatosplenomegaly  Extremities: no cyanosis or edema, no joint swelling  Skin: Skin color, texture normal, no rashes   Neurologic: Alert and oriented X3 , cranial nerves intact, sensory and motor normal,        INTERPRETATION OF TELEMETRY: Electronic ventricular pacing.          LABS:                          10.0   6.23  )-----------( 126      ( 2019 05:41 )             30.6     01-08    142  |  110<H>  |  49<H>  ----------------------------<  94  3.8   |  25  |  1.68<H>    Ca    8.6      2019 05:41    TPro  7.4  /  Alb  3.2<L>  /  TBili  0.9  /  DBili  x   /  AST  24  /  ALT  44  /  AlkPhos  97  01-06    CARDIAC MARKERS ( 2019 16:37 )  0.058 ng/mL / x     / x     / x     / x      CARDIAC MARKERS ( 2019 13:37 )  0.070 ng/mL / x     / x     / x     / x            Pro BNP  8544  @ 13:37  D Dimer  --  @ 13:37    PT/INR - ( 2019 13:37 )   PT: 13.2 sec;   INR: 1.18 ratio           Urinalysis Basic - ( 2019 16:56 )    Color: Yellow / Appearance: Clear / S.010 / pH: x  Gluc: x / Ketone: Negative  / Bili: Negative / Urobili: Negative mg/dL   Blood: x / Protein: 15 mg/dL / Nitrite: Negative   Leuk Esterase: Small / RBC: 0-2 /HPF / WBC 11-25   Sq Epi: x / Non Sq Epi: Few / Bacteria: Moderate            RADIOLOGY & ADDITIONAL STUDIES:        MEDICATIONS  (STANDING):  aspirin enteric coated 81 milliGRAM(s) Oral daily  atorvastatin 40 milliGRAM(s) Oral at bedtime  carvedilol 12.5 milliGRAM(s) Oral every 12 hours  cholecalciferol 1000 Unit(s) Oral daily  donepezil 5 milliGRAM(s) Oral at bedtime  ferrous    sulfate 325 milliGRAM(s) Oral daily  furosemide    Tablet 40 milliGRAM(s) Oral daily  heparin  Injectable 5000 Unit(s) SubCutaneous every 12 hours  levothyroxine 50 MICROGram(s) Oral daily  memantine 10 milliGRAM(s) Oral two times a day  multivitamin/minerals 1 Tablet(s) Oral daily  tamsulosin 0.4 milliGRAM(s) Oral at bedtime    MEDICATIONS  (PRN):  ALPRAZolam 0.25 milliGRAM(s) Oral at bedtime PRN anxiety      FAMILY HISTORY:      SOCIAL HISTORY:  ***    REVIEW OF SYSTEM:  Pertinent items are noted in HPI.  Constitutional	Negative for chills, fevers, sweats.    Eyes: 	Negative for visual disturbance.  Ears, nose, mouth, throat, and face: Negative for epistaxis, nasal congestion, sore throat and tinnitus.  Neck:	Negative for enlargement, pain and difficulty in swallowing  Respiration : Negative for cough, dyspnea on exertion, pleuritic chest pain and wheezing  Cardiovascular: Negative for chest pain, dyspnea and palpitations    Gastrointestinal : Negative for abdominal pain, diarrhea, nausea and vomiting  Genitourinary: Negative for dysuria, frequency and urinary incontinence .  Skin: Negative for  rash, pruritus, swelling, dryness .  	  Hematologic/lymphatic: Negative for bleeding and easy bruising  Musculoskeletal: Negative for arthralgias, back pain and muscle weakness.  Neurological: Negative for dizziness, headaches, seizures and tremors  Behavioral/Psych: Negative for mood change, depression.  Endocrine:	Negative for blurry vision, polydipsia and polyuria, diaphoresis.   Allergic/Immunologic:	Negative for anaphylaxis, angioedema and urticaria.      Vital Signs Last 24 Hrs  T(C): 36.6 (2019 05:03), Max: 36.7 (2019 17:47)  T(F): 97.8 (2019 05:03), Max: 98 (2019 17:47)  HR: 60 (2019 05:03) (60 - 60)  BP: 137/57 (2019 05:03) (137/57 - 159/73)  BP(mean): --  RR: 18 (2019 17:47) (18 - 18)  SpO2: 95% (2019 05:03) (95% - 100%)    I&O's Summary    2019 07:01  -  2019 07:00  --------------------------------------------------------  IN: 0 mL / OUT: 580 mL / NET: -580 mL      PHYSICAL EXAM  General Appearance: cooperative, no acute distress,   HEENT: PERRL, conjunctiva clear, EOM's intact, non injected pharynx, no exudate, TM   normal  Neck: Supple, , no adenopathy, thyroid: not enlarged, no carotid bruit or JVD  Back: Symmetric, no  tenderness,no soft tissue tenderness  Lungs: Clear to auscultation bilateral,no adventitious breath sounds, normal   expiratory phase  Heart: Regular rate and rhythm, S1, S2 normal, no murmur, rub or gallop  Abdomen: Soft, non-tender, bowel sounds active , no hepatosplenomegaly  Extremities: no cyanosis or edema, no joint swelling  Skin: Skin color, texture normal, no rashes   Neurologic: Alert and oriented X3 , cranial nerves intact, sensory and motor normal,        INTERPRETATION OF TELEMETRY:    ECG:        LABS:                          10.0   6.23  )-----------( 126      ( 2019 05:41 )             30.6     01-08    142  |  110<H>  |  49<H>  ----------------------------<  94  3.8   |  25  |  1.68<H>    Ca    8.6      2019 05:41    TPro  7.4  /  Alb  3.2<L>  /  TBili  0.9  /  DBili  x   /  AST  24  /  ALT  44  /  AlkPhos  97  01-06    CARDIAC MARKERS ( 2019 16:37 )  0.058 ng/mL / x     / x     / x     / x      CARDIAC MARKERS ( 2019 13:37 )  0.070 ng/mL / x     / x     / x     / x            Pro BNP  8544  @ 13:37  D Dimer  --  @ 13:37    PT/INR - ( 2019 13:37 )   PT: 13.2 sec;   INR: 1.18 ratio           Urinalysis Basic - ( 2019 16:56 )    Color: Yellow / Appearance: Clear / S.010 / pH: x  Gluc: x / Ketone: Negative  / Bili: Negative / Urobili: Negative mg/dL   Blood: x / Protein: 15 mg/dL / Nitrite: Negative   Leuk Esterase: Small / RBC: 0-2 /HPF / WBC 11-25   Sq Epi: x / Non Sq Epi: Few / Bacteria: Moderate            RADIOLOGY & ADDITIONAL STUDIES:

## 2019-01-08 NOTE — PROGRESS NOTE ADULT - SUBJECTIVE AND OBJECTIVE BOX
Patient is a 89y old  Male who presents with a chief complaint of CHF, Pulm Edema (2019 09:11)      HPI:  The patient is a 89 yo male with Hx. of CAD s/p CABG, CHF,  Atrial fib not on AC, becasue of bruising and bleeding, s/p PPM, [place]  who pressented in the ER for worsening SOB for the past few days.    pt is well known with prior thoracentesis done for eval his effusions in the past  now seating up in bed  seen and examined in his room  ct scan data discussed with him  no cp  no distress      seen and examined while in bed  no distress  worried about his wife    Family Hx.: Mother  of old age at 87,  Father had CVA at age 67, (2019 17:56)      PAST MEDICAL & SURGICAL HISTORY:  CHF (congestive heart failure)  Artificial pacemaker  S/P CABG (coronary artery bypass graft)      PREVIOUS DIAGNOSTIC TESTING:      MEDICATIONS  (STANDING):  aspirin enteric coated 81 milliGRAM(s) Oral daily  atorvastatin 40 milliGRAM(s) Oral at bedtime  carvedilol 12.5 milliGRAM(s) Oral every 12 hours  cholecalciferol 1000 Unit(s) Oral daily  donepezil 5 milliGRAM(s) Oral at bedtime  ferrous    sulfate 325 milliGRAM(s) Oral daily  furosemide   Injectable 40 milliGRAM(s) IV Push daily  heparin  Injectable 5000 Unit(s) SubCutaneous every 12 hours  levothyroxine 50 MICROGram(s) Oral daily  memantine 10 milliGRAM(s) Oral two times a day  multivitamin/minerals 1 Tablet(s) Oral daily  tamsulosin 0.4 milliGRAM(s) Oral at bedtime    MEDICATIONS  (PRN):  ALPRAZolam 0.25 milliGRAM(s) Oral at bedtime PRN anxiety      FAMILY HISTORY:      SOCIAL HISTORY:  ***    REVIEW OF SYSTEM:  Pertinent items are noted in HPI.  Constitutional negative for chills, fevers, sweats and weight loss  throat, and face:  negative for epistaxis, nasal congestion, sore throat and   tinnitus  Respiratory: negative for cough,pos dyspnea on exertion, pleuritic chest pain  and wheezing  Cardiovascular:  negative for chest pain,pos dyspnea and palpitations  Gastrointestinal: negative for abdominal pain, diarrhea, nausea and vomiting  Genitourinary: negative for dysuria, frequency and urinary incontinence  Skin:  negative for redness, rash, pruritus, swelling, dryness and   fissures  Hematologic/lymphatic: negative for bleeding and easy bruising  Allergic/Immunologic: negative for anaphylaxis, angioedema and urticaria    Vital Signs Last 24 Hrs  T(C): 36.6 (2019 05:03), Max: 36.7 (2019 17:47)  T(F): 97.8 (2019 05:03), Max: 98 (2019 17:47)  HR: 60 (2019 05:03) (60 - 60)  BP: 137/57 (2019 05:03) (137/57 - 159/73)  BP(mean): --  RR: 18 (2019 17:47) (18 - 18)  SpO2: 95% (2019 05:03) (95% - 100%)    I&O's Summary    2019 07:01  -  2019 07:00  --------------------------------------------------------  IN: 0 mL / OUT: 900 mL / NET: -900 mL      PHYSICAL EXAM  General Appearance: cooperative, no acute distress,   HEENT: PERRL, conjunctiva clear, EOM's intact, non injected pharynx, no exudate, TM   normal  Neck: Supple, , no adenopathy, thyroid: not enlarged, no carotid bruit or JVD  Back: Symmetric, no  tenderness,no soft tissue tenderness  Lungs: Decreased breath sounds right more than left lung base  few basilar crackles  Heart: Regular rate and rhythm, S1, S2 normal, no murmur, rub or gallop  Abdomen: Soft, non-tender, bowel sounds active , no hepatosplenomegaly  Extremities: no cyanosis, pos edema, no joint swelling  Skin: Skin color, texture normal, no rashes   Neurologic: Alert and oriented X3 , cranial nerves intact, sensory and motor normal,    ECG:    LABS:                          10.0   6.23  )-----------( 126      ( 2019 05:41 )             30.6   01-08    142  |  110<H>  |  49<H>  ----------------------------<  94  3.8   |  25  |  1.68<H>    Ca    8.6      2019 05:41    TPro  7.4  /  Alb  3.2<L>  /  TBili  0.9  /  DBili  x   /  AST  24  /  ALT  44  /  AlkPhos  97  06                        9.9    7.00  )-----------( 113      ( 2019 05:43 )             30.7     -07    143  |  111<H>  |  36<H>  ----------------------------<  95  4.0   |  24  |  1.47<H>    Ca    8.6      2019 05:43    TPro  7.4  /  Alb  3.2<L>  /  TBili  0.9  /  DBili  x   /  AST  24  /  ALT  44  /  AlkPhos  97  06    CARDIAC MARKERS ( 2019 16:37 )  0.058 ng/mL / x     / x     / x     / x      CARDIAC MARKERS ( 2019 13:37 )  0.070 ng/mL / x     / x     / x     / x            Pro BNP  8544  @ 13:37  D Dimer  --  @ 13:37    PT/INR - ( 2019 13:37 )   PT: 13.2 sec;   INR: 1.18 ratio           Urinalysis Basic - ( 2019 16:56 )    Color: Yellow / Appearance: Clear / S.010 / pH: x  Gluc: x / Ketone: Negative  / Bili: Negative / Urobili: Negative mg/dL   Blood: x / Protein: 15 mg/dL / Nitrite: Negative   Leuk Esterase: Small / RBC: 0-2 /HPF / WBC 11-25   Sq Epi: x / Non Sq Epi: Few / Bacteria: Moderate    < from: CT Chest No Cont (19 @ 14:45) >  COMPARISON: 2016.    PROCEDURE:   CT of the Chest was performed without intravenous contrast.  Sagittal and coronal reformats were performed.    FINDINGS:    LUNGS AND LARGE AIRWAYS: Bilateral perihilar opacities.  PLEURA: Small bilateral pleural effusions.  VESSELS: Atherosclerotic calcifications.   HEART: Heart size is enlarged. Left chest wall dual lead cardiac   pacemaker. Aortic valve replacement. Dense calcification of the mitral   annulus. No pericardial effusion.  MEDIASTINUM AND ANKIT: Subcentimeter short axis mediastinal lymph nodes.  CHEST WALL AND LOWER NECK: Within normal limits.  VISUALIZED UPPER ABDOMEN: Indeterminate lesions in the left kidney,   measuring up to 1.1 cm. Bilateral renal cysts.  BONES: Degenerative changes of the spine. Sternotomy. Old left sided rib   fractures.< from: US Kidney and Bladder (19 @ 10:52) >  INTERPRETATION:  CLINICAL INFORMATION: Renal mass as seen on CT follow-up    COMPARISON: CT chest 2019    TECHNIQUE: Sonography of the kidneys and bladder.     FINDINGS:    Right kidney:  10.0 cm. No renal mass, hydronephrosis or calculi. Simple   cyst in the upper pole measuring up to 2.2 cm.    Left kidney:  10.4 cm. No hydronephrosis. Exophytic cyst of the superior   pole with markedly thickenedseptation measuring 4.9 x 5.2 x 3.8 cm, an   indeterminate lesion, may reflect neoplasm, and further workup as   clinically indicated. Punctate nonobstructive calculus in the midpole.    Urinary bladder: Within normal limits.    IMPRESSION:     Exophytic cyst of the superior pole with markedly thickened septation   measuring 4.9 x 5.2 x 3.8 cm, an indeterminate lesion, may reflect   neoplasm, and further workup as clinically indicated.    < end of copied text >    IMPRESSION:   Pulmonary edema.    Indeterminate left renal lesions, for which further evaluation with renal   ultrasound or contrast-enhanced CT abdomen (renal mass protocol is   recommended.    < end of copied text >          RADIOLOGY & ADDITIONAL STUDIES:

## 2019-01-09 RX ORDER — ALPRAZOLAM 0.25 MG/1
0.25 TABLET ORAL
Refills: 0 | Status: DISCONTINUED | COMMUNITY

## 2019-01-09 RX ORDER — ATORVASTATIN CALCIUM 10 MG/1
10 TABLET, FILM COATED ORAL
Refills: 0 | Status: DISCONTINUED | COMMUNITY

## 2019-01-09 RX ORDER — FUROSEMIDE 80 MG/1
TABLET ORAL
Refills: 0 | Status: DISCONTINUED | COMMUNITY

## 2019-01-09 RX ORDER — CARVEDILOL 3.12 MG/1
TABLET, FILM COATED ORAL
Refills: 0 | Status: DISCONTINUED | COMMUNITY

## 2019-01-09 RX ORDER — LEVOTHYROXINE SODIUM 0.03 MG/1
25 TABLET ORAL
Refills: 0 | Status: DISCONTINUED | COMMUNITY

## 2019-01-10 DIAGNOSIS — Z95.2 PRESENCE OF PROSTHETIC HEART VALVE: ICD-10-CM

## 2019-01-10 DIAGNOSIS — N18.9 CHRONIC KIDNEY DISEASE, UNSPECIFIED: ICD-10-CM

## 2019-01-10 DIAGNOSIS — Z87.891 PERSONAL HISTORY OF NICOTINE DEPENDENCE: ICD-10-CM

## 2019-01-10 DIAGNOSIS — J98.11 ATELECTASIS: ICD-10-CM

## 2019-01-10 DIAGNOSIS — I24.8 OTHER FORMS OF ACUTE ISCHEMIC HEART DISEASE: ICD-10-CM

## 2019-01-10 DIAGNOSIS — I50.43 ACUTE ON CHRONIC COMBINED SYSTOLIC (CONGESTIVE) AND DIASTOLIC (CONGESTIVE) HEART FAILURE: ICD-10-CM

## 2019-01-10 DIAGNOSIS — I48.1 PERSISTENT ATRIAL FIBRILLATION: ICD-10-CM

## 2019-01-10 DIAGNOSIS — D64.9 ANEMIA, UNSPECIFIED: ICD-10-CM

## 2019-01-10 DIAGNOSIS — F03.90 UNSPECIFIED DEMENTIA WITHOUT BEHAVIORAL DISTURBANCE: ICD-10-CM

## 2019-01-10 DIAGNOSIS — I13.0 HYPERTENSIVE HEART AND CHRONIC KIDNEY DISEASE WITH HEART FAILURE AND STAGE 1 THROUGH STAGE 4 CHRONIC KIDNEY DISEASE, OR UNSPECIFIED CHRONIC KIDNEY DISEASE: ICD-10-CM

## 2019-01-10 DIAGNOSIS — I50.23 ACUTE ON CHRONIC SYSTOLIC (CONGESTIVE) HEART FAILURE: ICD-10-CM

## 2019-01-10 DIAGNOSIS — Z95.0 PRESENCE OF CARDIAC PACEMAKER: ICD-10-CM

## 2019-01-10 DIAGNOSIS — Z79.899 OTHER LONG TERM (CURRENT) DRUG THERAPY: ICD-10-CM

## 2019-01-10 DIAGNOSIS — I48.2 CHRONIC ATRIAL FIBRILLATION: ICD-10-CM

## 2019-01-10 DIAGNOSIS — N40.0 BENIGN PROSTATIC HYPERPLASIA WITHOUT LOWER URINARY TRACT SYMPTOMS: ICD-10-CM

## 2019-01-10 DIAGNOSIS — I25.10 ATHEROSCLEROTIC HEART DISEASE OF NATIVE CORONARY ARTERY WITHOUT ANGINA PECTORIS: ICD-10-CM

## 2019-01-11 LAB
CULTURE RESULTS: SIGNIFICANT CHANGE UP
CULTURE RESULTS: SIGNIFICANT CHANGE UP
SPECIMEN SOURCE: SIGNIFICANT CHANGE UP
SPECIMEN SOURCE: SIGNIFICANT CHANGE UP

## 2019-01-16 ENCOUNTER — EMERGENCY (EMERGENCY)
Facility: HOSPITAL | Age: 84
LOS: 0 days | Discharge: HOME | End: 2019-01-17
Attending: EMERGENCY MEDICINE | Admitting: EMERGENCY MEDICINE
Payer: MEDICARE

## 2019-01-16 VITALS — HEIGHT: 70 IN | WEIGHT: 160.06 LBS

## 2019-01-16 DIAGNOSIS — T46.1X1A POISONING BY CALCIUM-CHANNEL BLOCKERS, ACCIDENTAL (UNINTENTIONAL), INITIAL ENCOUNTER: ICD-10-CM

## 2019-01-16 DIAGNOSIS — T44.7X1A POISONING BY BETA-ADRENORECEPTOR ANTAGONISTS, ACCIDENTAL (UNINTENTIONAL), INITIAL ENCOUNTER: ICD-10-CM

## 2019-01-16 DIAGNOSIS — Z95.1 PRESENCE OF AORTOCORONARY BYPASS GRAFT: Chronic | ICD-10-CM

## 2019-01-16 DIAGNOSIS — T45.2X1A POISONING BY VITAMINS, ACCIDENTAL (UNINTENTIONAL), INITIAL ENCOUNTER: ICD-10-CM

## 2019-01-16 DIAGNOSIS — T50.901A POISONING BY UNSPECIFIED DRUGS, MEDICAMENTS AND BIOLOGICAL SUBSTANCES, ACCIDENTAL (UNINTENTIONAL), INITIAL ENCOUNTER: ICD-10-CM

## 2019-01-16 DIAGNOSIS — R94.31 ABNORMAL ELECTROCARDIOGRAM [ECG] [EKG]: ICD-10-CM

## 2019-01-16 DIAGNOSIS — I50.9 HEART FAILURE, UNSPECIFIED: ICD-10-CM

## 2019-01-16 DIAGNOSIS — Z95.0 PRESENCE OF CARDIAC PACEMAKER: Chronic | ICD-10-CM
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
## 2019-01-16 DIAGNOSIS — Z79.82 LONG TERM (CURRENT) USE OF ASPIRIN: ICD-10-CM

## 2019-01-16 DIAGNOSIS — T50.1X1A POISONING BY LOOP [HIGH-CEILING] DIURETICS, ACCIDENTAL (UNINTENTIONAL), INITIAL ENCOUNTER: ICD-10-CM

## 2019-01-16 DIAGNOSIS — Z95.0 PRESENCE OF CARDIAC PACEMAKER: ICD-10-CM
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
## 2019-01-16 LAB
ALBUMIN SERPL ELPH-MCNC: 3.3 G/DL — SIGNIFICANT CHANGE UP (ref 3.3–5)
ALP SERPL-CCNC: 90 U/L — SIGNIFICANT CHANGE UP (ref 40–120)
ALT FLD-CCNC: 29 U/L — SIGNIFICANT CHANGE UP (ref 12–78)
ANION GAP SERPL CALC-SCNC: 8 MMOL/L — SIGNIFICANT CHANGE UP (ref 5–17)
AST SERPL-CCNC: 19 U/L — SIGNIFICANT CHANGE UP (ref 15–37)
BASOPHILS # BLD AUTO: 0.06 K/UL — SIGNIFICANT CHANGE UP (ref 0–0.2)
BASOPHILS NFR BLD AUTO: 0.8 % — SIGNIFICANT CHANGE UP (ref 0–2)
BILIRUB SERPL-MCNC: 0.4 MG/DL — SIGNIFICANT CHANGE UP (ref 0.2–1.2)
BUN SERPL-MCNC: 45 MG/DL — HIGH (ref 7–23)
CALCIUM SERPL-MCNC: 8.7 MG/DL — SIGNIFICANT CHANGE UP (ref 8.5–10.1)
CHLORIDE SERPL-SCNC: 103 MMOL/L — SIGNIFICANT CHANGE UP (ref 96–108)
CO2 SERPL-SCNC: 27 MMOL/L — SIGNIFICANT CHANGE UP (ref 22–31)
CREAT SERPL-MCNC: 2.08 MG/DL — HIGH (ref 0.5–1.3)
EOSINOPHIL # BLD AUTO: 0.39 K/UL — SIGNIFICANT CHANGE UP (ref 0–0.5)
EOSINOPHIL NFR BLD AUTO: 5.4 % — SIGNIFICANT CHANGE UP (ref 0–6)
GLUCOSE SERPL-MCNC: 98 MG/DL — SIGNIFICANT CHANGE UP (ref 70–99)
HCT VFR BLD CALC: 36.4 % — LOW (ref 39–50)
HGB BLD-MCNC: 11.9 G/DL — LOW (ref 13–17)
IMM GRANULOCYTES NFR BLD AUTO: 0.3 % — SIGNIFICANT CHANGE UP (ref 0–1.5)
LYMPHOCYTES # BLD AUTO: 0.9 K/UL — LOW (ref 1–3.3)
LYMPHOCYTES # BLD AUTO: 12.5 % — LOW (ref 13–44)
MCHC RBC-ENTMCNC: 31.8 PG — SIGNIFICANT CHANGE UP (ref 27–34)
MCHC RBC-ENTMCNC: 32.7 GM/DL — SIGNIFICANT CHANGE UP (ref 32–36)
MCV RBC AUTO: 97.3 FL — SIGNIFICANT CHANGE UP (ref 80–100)
MONOCYTES # BLD AUTO: 1.01 K/UL — HIGH (ref 0–0.9)
MONOCYTES NFR BLD AUTO: 14.1 % — HIGH (ref 2–14)
NEUTROPHILS # BLD AUTO: 4.8 K/UL — SIGNIFICANT CHANGE UP (ref 1.8–7.4)
NEUTROPHILS NFR BLD AUTO: 66.9 % — SIGNIFICANT CHANGE UP (ref 43–77)
NRBC # BLD: 0 /100 WBCS — SIGNIFICANT CHANGE UP (ref 0–0)
PLATELET # BLD AUTO: 174 K/UL — SIGNIFICANT CHANGE UP (ref 150–400)
POTASSIUM SERPL-MCNC: 4.7 MMOL/L — SIGNIFICANT CHANGE UP (ref 3.5–5.3)
POTASSIUM SERPL-SCNC: 4.7 MMOL/L — SIGNIFICANT CHANGE UP (ref 3.5–5.3)
PROT SERPL-MCNC: 7.6 GM/DL — SIGNIFICANT CHANGE UP (ref 6–8.3)
RBC # BLD: 3.74 M/UL — LOW (ref 4.2–5.8)
RBC # FLD: 14.6 % — HIGH (ref 10.3–14.5)
SODIUM SERPL-SCNC: 138 MMOL/L — SIGNIFICANT CHANGE UP (ref 135–145)
WBC # BLD: 7.18 K/UL — SIGNIFICANT CHANGE UP (ref 3.8–10.5)
WBC # FLD AUTO: 7.18 K/UL — SIGNIFICANT CHANGE UP (ref 3.8–10.5)

## 2019-01-16 PROCEDURE — 0: CUSTOM

## 2019-01-16 PROCEDURE — 99284 EMERGENCY DEPT VISIT MOD MDM: CPT

## 2019-01-16 PROCEDURE — 93010 ELECTROCARDIOGRAM REPORT: CPT

## 2019-01-16 RX ORDER — SODIUM CHLORIDE 9 MG/ML
3 INJECTION INTRAMUSCULAR; INTRAVENOUS; SUBCUTANEOUS ONCE
Qty: 0 | Refills: 0 | Status: COMPLETED | OUTPATIENT
Start: 2019-01-16 | End: 2019-01-16

## 2019-01-16 RX ADMIN — SODIUM CHLORIDE 3 MILLILITER(S): 9 INJECTION INTRAMUSCULAR; INTRAVENOUS; SUBCUTANEOUS at 20:13

## 2019-01-16 NOTE — ED ADULT NURSE NOTE - NSIMPLEMENTINTERV_GEN_ALL_ED
Implemented All Fall with Harm Risk Interventions:  [place] to call system. Call bell, personal items and telephone within reach. Instruct patient to call for assistance. Room bathroom lighting operational. Non-slip footwear when patient is off stretcher. Physically safe environment: no spills, clutter or unnecessary equipment. Stretcher in lowest position, wheels locked, appropriate side rails in place. Provide visual cue, wrist band, yellow gown, etc. Monitor gait and stability. Monitor for mental status changes and reorient to person, place, and time. Review medications for side effects contributing to fall risk. Reinforce activity limits and safety measures with patient and family. Provide visual clues: red socks.

## 2019-01-16 NOTE — ED PROVIDER NOTE - PROGRESS NOTE DETAILS
Kari FIELD for ED attending Dr. Kim: Discussed with toxicology fellow at [facility], who recommends watching pt for 12 hours after time of ingestion due to the fact that Cardizem was an extended release tablet. Antonio TUCKER: Received s/o from Dr. Bowie- patient resting comfortably; no complaints; observed in ED; son will come to pick patient up; f/u with pmd in 1-2 days without fail.

## 2019-01-16 NOTE — ED PROVIDER NOTE - CARDIAC, MLM
+Left chest pacemaker. Normal rate, regular rhythm.  Heart sounds S1, S2.  No murmurs, rubs or gallops.

## 2019-01-16 NOTE — ED CLERICAL - NS ED CLERK NOTE PRE-ARRIVAL INFORMATION; ADDITIONAL PRE-ARRIVAL INFORMATION
This patient is enrolled in the readmission program and has active care navigation. This patient can be followed up by the care navigation team within 24 hours. To arrange close follow-up or to obtain additional clinical information about this patient, please call the contact number above.   Please call the hospitalist as needed to collaborate on further medical management ([phone])

## 2019-01-16 NOTE — ED PROVIDER NOTE - OBJECTIVE STATEMENT
89 y/o male with a PMHx of CHF, s/p CABG, s/p pacemaker presents to the ED c/o taking the wrong medication at 6:15 pm. According to pt's family who is present, pt was mistakenly given his wife's medication that includes Cardizem, Lasix, Metoprolol, potassium, and vitamin D. Family currently expresses concern about taking the wrong medication, which includes 40 mg of Lasix, 100 mg Metoprolol and 120 mg Cardizem. Did not have dinner PTA. PCP: [facility].

## 2019-01-16 NOTE — ED ADULT TRIAGE NOTE - CHIEF COMPLAINT QUOTE
as per family, pt took his wife's medications today.  pt took cardizem, lasix, metoprolol, potassium and vit D3.  pt denies any dizziness, sob, cp, nvd, or pain.  no s/sx of distress noted.

## 2019-01-16 NOTE — ED ADULT NURSE NOTE - OBJECTIVE STATEMENT
PT brought in by Son and Daughter in Law. DIL accidentally gave pt his wifes medications. Cardizem 120, Lasix 40, Metoprolol 100, K 20, and Vit D3. Pt currently states he feels normally and denies any pain, chest pain, sob. pts vitals currently 144/79, 58 HR, and 97%RA. No complaints at this time. HX CHF

## 2019-01-17 VITALS
HEART RATE: 59 BPM | SYSTOLIC BLOOD PRESSURE: 157 MMHG | TEMPERATURE: 97 F | DIASTOLIC BLOOD PRESSURE: 79 MMHG | OXYGEN SATURATION: 99 % | RESPIRATION RATE: 18 BRPM

## 2019-01-17 PROBLEM — I50.9 HEART FAILURE, UNSPECIFIED: Chronic | Status: ACTIVE | Noted: 2019-01-06

## 2019-01-24 ENCOUNTER — APPOINTMENT (OUTPATIENT)
Age: 84
End: 2019-01-24

## 2019-01-24 VITALS
RESPIRATION RATE: 20 BRPM | HEART RATE: 61 BPM | SYSTOLIC BLOOD PRESSURE: 96 MMHG | DIASTOLIC BLOOD PRESSURE: 52 MMHG | OXYGEN SATURATION: 94 %

## 2019-01-24 DIAGNOSIS — I50.9 HEART FAILURE, UNSPECIFIED: ICD-10-CM

## 2019-01-24 RX ORDER — MELATONIN 3 MG
25 MCG TABLET ORAL
Refills: 0 | Status: ACTIVE | COMMUNITY
Start: 2019-01-09

## 2019-01-24 RX ORDER — LEVOTHYROXINE SODIUM 0.05 MG/1
50 TABLET ORAL DAILY
Refills: 0 | Status: ACTIVE | COMMUNITY
Start: 2019-01-09

## 2019-01-24 RX ORDER — VIT A/VIT C/VIT E/ZINC/COPPER 4296-226
CAPSULE ORAL DAILY
Refills: 0 | Status: ACTIVE | COMMUNITY
Start: 2019-01-09

## 2019-01-24 RX ORDER — DONEPEZIL HYDROCHLORIDE 5 MG/1
5 TABLET, FILM COATED ORAL
Refills: 0 | Status: ACTIVE | COMMUNITY

## 2019-01-24 RX ORDER — FUROSEMIDE 40 MG/1
40 TABLET ORAL
Refills: 0 | Status: ACTIVE | COMMUNITY

## 2019-01-24 RX ORDER — CHLORHEXIDINE GLUCONATE 4 %
325 (65 FE) LIQUID (ML) TOPICAL
Refills: 0 | Status: DISCONTINUED | COMMUNITY
Start: 2019-01-09 | End: 2019-01-24

## 2019-01-24 RX ORDER — CARVEDILOL 12.5 MG/1
12.5 TABLET, FILM COATED ORAL TWICE DAILY
Refills: 0 | Status: ACTIVE | COMMUNITY

## 2019-01-24 RX ORDER — ALPRAZOLAM 0.25 MG/1
0.25 TABLET ORAL
Refills: 0 | Status: ACTIVE | COMMUNITY
Start: 2019-01-24

## 2019-01-24 RX ORDER — TAMSULOSIN HYDROCHLORIDE 0.4 MG/1
0.4 CAPSULE ORAL
Refills: 0 | Status: ACTIVE | COMMUNITY

## 2019-01-24 RX ORDER — ASPIRIN 81 MG/1
81 TABLET ORAL DAILY
Refills: 0 | Status: ACTIVE | COMMUNITY
Start: 2019-01-09

## 2019-01-24 RX ORDER — MEMANTINE HYDROCHLORIDE 10 MG/1
10 TABLET ORAL
Refills: 0 | Status: ACTIVE | COMMUNITY

## 2019-01-24 RX ORDER — ATORVASTATIN CALCIUM 40 MG/1
40 TABLET, FILM COATED ORAL
Refills: 0 | Status: ACTIVE | COMMUNITY

## 2019-01-24 NOTE — PHYSICAL EXAM
[No Acute Distress] : no acute distress [Well Nourished] : well nourished [Well Developed] : well developed [No Respiratory Distress] : no respiratory distress  [Clear to Auscultation] : lungs were clear to auscultation bilaterally [No Accessory Muscle Use] : no accessory muscle use [Normal Rate] : normal rate  [Regular Rhythm] : with a regular rhythm [Normal S1, S2] : normal S1 and S2 [No Murmur] : no murmur heard [No Edema] : there was no peripheral edema [No Extremity Clubbing/Cyanosis] : no extremity clubbing/cyanosis [Soft] : abdomen soft [Non Tender] : non-tender [Normal Bowel Sounds] : normal bowel sounds [Grossly Normal Strength/Tone] : grossly normal strength/tone [No Focal Deficits] : no focal deficits [Alert and Oriented x3] : oriented to person, place, and time

## 2019-01-25 NOTE — HISTORY OF PRESENT ILLNESS
[FreeTextEntry1] : Congestive heart failure [de-identified] : 89 year old male with history of CAD s/p CABG, CHF, AF not on AC, PPM who presented to [facility] on 1/6/19, patient was treated with IV lasix with improvement in symptoms and was discharged on 1/8/19 with [facility].  Patient has followed up with his PCP Dr. Sandoval and is doing well.  Patient is checking weight periodically and weight is stable since discharge.  Patient denies any CP, palpitations, SOB, diizziness, lightheadedness, cough, weight gain, edema, orthopnea, PND.  Patient seen at home for follow up.  HHA, Susan, and wife present during visit.

## 2019-01-25 NOTE — ASSESSMENT
[FreeTextEntry1] : 89 year old with recent hospitalization for congestive heart failure who is hypotensive today.  
Keeley

## 2019-01-25 NOTE — PLAN
[FreeTextEntry1] : CHF:\par Advised patient to liberalize fluid intake today.\par Monitor blood pressure, calibrated home BP cuff.\par Advised that HHA should recheck blood pressure this evening prior to Coreg.\par Requested home health aide contact NP with blood pressure readings.\par Advised daily weights, low sodium diet.\par Schedule follow up with cardiologist, Dr. Colmenares\par Continue current medication regimen.\par \par Reinforced TCM program, contact information and encouraged patient or aide to call with any symptoms, questions or concerns.

## 2019-02-08 ENCOUNTER — APPOINTMENT (OUTPATIENT)
Dept: ELECTROPHYSIOLOGY | Facility: CLINIC | Age: 84
End: 2019-02-08
Payer: MEDICARE

## 2019-02-08 VITALS
HEIGHT: 71 IN | WEIGHT: 162 LBS | BODY MASS INDEX: 22.68 KG/M2 | DIASTOLIC BLOOD PRESSURE: 65 MMHG | SYSTOLIC BLOOD PRESSURE: 125 MMHG | HEART RATE: 60 BPM

## 2019-02-08 PROCEDURE — 93279 PRGRMG DEV EVAL PM/LDLS PM: CPT

## 2019-05-07 ENCOUNTER — APPOINTMENT (OUTPATIENT)
Dept: ELECTROPHYSIOLOGY | Facility: CLINIC | Age: 84
End: 2019-05-07
Payer: MEDICARE

## 2019-05-07 PROCEDURE — 93294 REM INTERROG EVL PM/LDLS PM: CPT

## 2019-05-07 PROCEDURE — 93296 REM INTERROG EVL PM/IDS: CPT

## 2019-08-13 ENCOUNTER — APPOINTMENT (OUTPATIENT)
Dept: ELECTROPHYSIOLOGY | Facility: CLINIC | Age: 84
End: 2019-08-13
Payer: MEDICARE

## 2019-08-13 DIAGNOSIS — I48.2 CHRONIC ATRIAL FIBRILLATION: ICD-10-CM

## 2019-08-13 PROCEDURE — 93296 REM INTERROG EVL PM/IDS: CPT

## 2019-08-13 PROCEDURE — 93294 REM INTERROG EVL PM/LDLS PM: CPT

## 2019-10-14 PROBLEM — I48.2 CHRONIC ATRIAL FIBRILLATION: Status: ACTIVE | Noted: 2017-06-06

## 2019-10-30 ENCOUNTER — EMERGENCY (EMERGENCY)
Facility: HOSPITAL | Age: 84
LOS: 0 days | Discharge: HOME | End: 2019-10-30
Attending: STUDENT IN AN ORGANIZED HEALTH CARE EDUCATION/TRAINING PROGRAM
Payer: MEDICARE

## 2019-10-30 VITALS
SYSTOLIC BLOOD PRESSURE: 114 MMHG | RESPIRATION RATE: 16 BRPM | TEMPERATURE: 98 F | HEART RATE: 69 BPM | DIASTOLIC BLOOD PRESSURE: 55 MMHG | OXYGEN SATURATION: 95 %

## 2019-10-30 VITALS
OXYGEN SATURATION: 97 % | HEART RATE: 64 BPM | RESPIRATION RATE: 18 BRPM | SYSTOLIC BLOOD PRESSURE: 166 MMHG | TEMPERATURE: 97 F | WEIGHT: 179.9 LBS | DIASTOLIC BLOOD PRESSURE: 78 MMHG | HEIGHT: 72 IN

## 2019-10-30 DIAGNOSIS — Z79.899 OTHER LONG TERM (CURRENT) DRUG THERAPY: ICD-10-CM

## 2019-10-30 DIAGNOSIS — I50.9 HEART FAILURE, UNSPECIFIED: ICD-10-CM

## 2019-10-30 DIAGNOSIS — Z95.0 PRESENCE OF CARDIAC PACEMAKER: ICD-10-CM
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
## 2019-10-30 DIAGNOSIS — Z79.82 LONG TERM (CURRENT) USE OF ASPIRIN: ICD-10-CM

## 2019-10-30 DIAGNOSIS — Z95.1 PRESENCE OF AORTOCORONARY BYPASS GRAFT: ICD-10-CM

## 2019-10-30 DIAGNOSIS — S01.01XA LACERATION WITHOUT FOREIGN BODY OF SCALP, INITIAL ENCOUNTER: ICD-10-CM

## 2019-10-30 DIAGNOSIS — Z95.1 PRESENCE OF AORTOCORONARY BYPASS GRAFT: Chronic | ICD-10-CM

## 2019-10-30 DIAGNOSIS — Z95.0 PRESENCE OF CARDIAC PACEMAKER: Chronic | ICD-10-CM
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
## 2019-10-30 LAB
ALBUMIN SERPL ELPH-MCNC: 3.1 G/DL — LOW (ref 3.3–5)
ALP SERPL-CCNC: 76 U/L — SIGNIFICANT CHANGE UP (ref 40–120)
ALT FLD-CCNC: 23 U/L — SIGNIFICANT CHANGE UP (ref 12–78)
ANION GAP SERPL CALC-SCNC: 6 MMOL/L — SIGNIFICANT CHANGE UP (ref 5–17)
APTT BLD: 33.5 SEC — SIGNIFICANT CHANGE UP (ref 27.5–36.3)
AST SERPL-CCNC: 17 U/L — SIGNIFICANT CHANGE UP (ref 15–37)
BASOPHILS # BLD AUTO: 0.07 K/UL — SIGNIFICANT CHANGE UP (ref 0–0.2)
BASOPHILS NFR BLD AUTO: 0.9 % — SIGNIFICANT CHANGE UP (ref 0–2)
BILIRUB SERPL-MCNC: 0.6 MG/DL — SIGNIFICANT CHANGE UP (ref 0.2–1.2)
BUN SERPL-MCNC: 42 MG/DL — HIGH (ref 7–23)
CALCIUM SERPL-MCNC: 8.9 MG/DL — SIGNIFICANT CHANGE UP (ref 8.5–10.1)
CHLORIDE SERPL-SCNC: 114 MMOL/L — HIGH (ref 96–108)
CO2 SERPL-SCNC: 25 MMOL/L — SIGNIFICANT CHANGE UP (ref 22–31)
CREAT SERPL-MCNC: 1.73 MG/DL — HIGH (ref 0.5–1.3)
EOSINOPHIL # BLD AUTO: 0.26 K/UL — SIGNIFICANT CHANGE UP (ref 0–0.5)
EOSINOPHIL NFR BLD AUTO: 3.3 % — SIGNIFICANT CHANGE UP (ref 0–6)
GLUCOSE SERPL-MCNC: 105 MG/DL — HIGH (ref 70–99)
HCT VFR BLD CALC: 31.4 % — LOW (ref 39–50)
HGB BLD-MCNC: 10.1 G/DL — LOW (ref 13–17)
IMM GRANULOCYTES NFR BLD AUTO: 0.3 % — SIGNIFICANT CHANGE UP (ref 0–1.5)
INR BLD: 1.16 RATIO — SIGNIFICANT CHANGE UP (ref 0.88–1.16)
LYMPHOCYTES # BLD AUTO: 0.77 K/UL — LOW (ref 1–3.3)
LYMPHOCYTES # BLD AUTO: 9.9 % — LOW (ref 13–44)
MCHC RBC-ENTMCNC: 32 PG — SIGNIFICANT CHANGE UP (ref 27–34)
MCHC RBC-ENTMCNC: 32.2 GM/DL — SIGNIFICANT CHANGE UP (ref 32–36)
MCV RBC AUTO: 99.4 FL — SIGNIFICANT CHANGE UP (ref 80–100)
MONOCYTES # BLD AUTO: 0.79 K/UL — SIGNIFICANT CHANGE UP (ref 0–0.9)
MONOCYTES NFR BLD AUTO: 10.1 % — SIGNIFICANT CHANGE UP (ref 2–14)
NEUTROPHILS # BLD AUTO: 5.9 K/UL — SIGNIFICANT CHANGE UP (ref 1.8–7.4)
NEUTROPHILS NFR BLD AUTO: 75.5 % — SIGNIFICANT CHANGE UP (ref 43–77)
PLATELET # BLD AUTO: 127 K/UL — LOW (ref 150–400)
POTASSIUM SERPL-MCNC: 5.5 MMOL/L — HIGH (ref 3.5–5.3)
POTASSIUM SERPL-SCNC: 5.5 MMOL/L — HIGH (ref 3.5–5.3)
PROT SERPL-MCNC: 6.9 GM/DL — SIGNIFICANT CHANGE UP (ref 6–8.3)
PROTHROM AB SERPL-ACNC: 12.9 SEC — SIGNIFICANT CHANGE UP (ref 10–12.9)
RBC # BLD: 3.16 M/UL — LOW (ref 4.2–5.8)
RBC # FLD: 14.6 % — HIGH (ref 10.3–14.5)
SODIUM SERPL-SCNC: 145 MMOL/L — SIGNIFICANT CHANGE UP (ref 135–145)
WBC # BLD: 7.81 K/UL — SIGNIFICANT CHANGE UP (ref 3.8–10.5)
WBC # FLD AUTO: 7.81 K/UL — SIGNIFICANT CHANGE UP (ref 3.8–10.5)

## 2019-10-30 PROCEDURE — 93005 ELECTROCARDIOGRAM TRACING: CPT | Mod: XU

## 2019-10-30 PROCEDURE — 93010 ELECTROCARDIOGRAM REPORT: CPT

## 2019-10-30 PROCEDURE — 71045 X-RAY EXAM CHEST 1 VIEW: CPT | Mod: 26

## 2019-10-30 PROCEDURE — 99284 EMERGENCY DEPT VISIT MOD MDM: CPT | Mod: 25

## 2019-10-30 PROCEDURE — 70450 CT HEAD/BRAIN W/O DYE: CPT

## 2019-10-30 PROCEDURE — 70450 CT HEAD/BRAIN W/O DYE: CPT | Mod: 26

## 2019-10-30 PROCEDURE — 85025 COMPLETE CBC W/AUTO DIFF WBC: CPT

## 2019-10-30 PROCEDURE — 99284 EMERGENCY DEPT VISIT MOD MDM: CPT

## 2019-10-30 PROCEDURE — 12002 RPR S/N/AX/GEN/TRNK2.6-7.5CM: CPT

## 2019-10-30 PROCEDURE — 80053 COMPREHEN METABOLIC PANEL: CPT

## 2019-10-30 PROCEDURE — 72125 CT NECK SPINE W/O DYE: CPT

## 2019-10-30 PROCEDURE — 71045 X-RAY EXAM CHEST 1 VIEW: CPT

## 2019-10-30 PROCEDURE — 85610 PROTHROMBIN TIME: CPT

## 2019-10-30 PROCEDURE — 85730 THROMBOPLASTIN TIME PARTIAL: CPT

## 2019-10-30 PROCEDURE — 72125 CT NECK SPINE W/O DYE: CPT | Mod: 26

## 2019-10-30 PROCEDURE — 36415 COLL VENOUS BLD VENIPUNCTURE: CPT

## 2019-10-30 RX ORDER — TETANUS TOXOID, REDUCED DIPHTHERIA TOXOID AND ACELLULAR PERTUSSIS VACCINE, ADSORBED 5; 2.5; 8; 8; 2.5 [IU]/.5ML; [IU]/.5ML; UG/.5ML; UG/.5ML; UG/.5ML
0.5 SUSPENSION INTRAMUSCULAR ONCE
Refills: 0 | Status: COMPLETED | OUTPATIENT
Start: 2019-10-30 | End: 2019-10-30

## 2019-10-30 NOTE — ED PROVIDER NOTE - OBJECTIVE STATEMENT
89 y/o male with PMHx of CHF, s/p pacemaker, s/p CABG presents to the ED BIBA s/p mechanical fall this AM. States he was standing at home and fell to the ground. +LOC, +head trauma. Does not remember falling. Pt unsure what he struck his head on. +2cm lac to back of head, left side.

## 2019-10-30 NOTE — ED PROVIDER NOTE - PROGRESS NOTE DETAILS
ED Scribe Abeba Bishop is scribing for and in the presence of ED Attending Antonio Kari TORREZ for ED Attending Dr. Alvarez: Spoke with daughter. Per daughter pt had mechanical fall while using walker, pt tripped over something on floor. striking head on ground. shared decision making with family and they will bring home pending diagnostic workup Antonio TUCKER: Patient and family comfortable with discharge at this time; notified of all labs; suture removal in 7-10 days; strict return precautions given to family at this time.

## 2019-10-30 NOTE — ED ADULT NURSE NOTE - NSIMPLEMENTINTERV_GEN_ALL_ED
Implemented All Fall with Harm Risk Interventions:  [place] to call system. Call bell, personal items and telephone within reach. Instruct patient to call for assistance. Room bathroom lighting operational. Non-slip footwear when patient is off stretcher. Physically safe environment: no spills, clutter or unnecessary equipment. Stretcher in lowest position, wheels locked, appropriate side rails in place. Provide visual cue, wrist band, yellow gown, etc. Monitor gait and stability. Monitor for mental status changes and reorient to person, place, and time. Review medications for side effects contributing to fall risk. Reinforce activity limits and safety measures with patient and family. Provide visual clues: red socks.

## 2019-10-30 NOTE — ED PROVIDER NOTE - PATIENT PORTAL LINK FT
You can access the FollowMyHealth Patient Portal offered by [facility] by registering at the following website: [email or web address]. By joining [employer]’s FollowMyHealth portal, you will also be able to view your health information using other applications (apps) compatible with our system.

## 2019-10-30 NOTE — ED PROCEDURE NOTE - CPROC ED INFORMED CONSENT1
Preceptor Attestation:   Patient seen, evaluated and discussed with the resident. I have verified the content of the note, which accurately reflects my assessment of the patient and the plan of care.   Supervising Physician:  Chris Spence MD      Benefits, risks, and possible complications of procedure explained to patient/caregiver who verbalized understanding and gave verbal consent.

## 2019-10-30 NOTE — ED ADULT TRIAGE NOTE - CHIEF COMPLAINT QUOTE
patient brought in by EMS from home s/p fall from standing. hit head on floor. unknown LOC but patient does not remember falling. unwitnessed. laceration to back of head with bleeding through pressure dressing by EMS. patient at baseline mental status. on ASA 81 daily. trauma alert initiated in triage.

## 2019-11-01 ENCOUNTER — EMERGENCY (EMERGENCY)
Facility: HOSPITAL | Age: 84
LOS: 1 days | Discharge: HOME | End: 2019-11-03
Attending: EMERGENCY MEDICINE
Payer: MEDICARE

## 2019-11-01 VITALS
WEIGHT: 190.04 LBS | SYSTOLIC BLOOD PRESSURE: 119 MMHG | HEIGHT: 70 IN | DIASTOLIC BLOOD PRESSURE: 48 MMHG | OXYGEN SATURATION: 92 % | HEART RATE: 64 BPM | TEMPERATURE: 98 F | RESPIRATION RATE: 19 BRPM

## 2019-11-01 DIAGNOSIS — I48.91 UNSPECIFIED ATRIAL FIBRILLATION: ICD-10-CM

## 2019-11-01 DIAGNOSIS — Z79.899 OTHER LONG TERM (CURRENT) DRUG THERAPY: ICD-10-CM

## 2019-11-01 DIAGNOSIS — Z79.82 LONG TERM (CURRENT) USE OF ASPIRIN: ICD-10-CM

## 2019-11-01 DIAGNOSIS — Z95.1 PRESENCE OF AORTOCORONARY BYPASS GRAFT: ICD-10-CM

## 2019-11-01 DIAGNOSIS — Z95.0 PRESENCE OF CARDIAC PACEMAKER: Chronic | ICD-10-CM

## 2019-11-01 DIAGNOSIS — R41.82 ALTERED MENTAL STATUS, UNSPECIFIED: ICD-10-CM

## 2019-11-01 DIAGNOSIS — Z87.891 PERSONAL HISTORY OF NICOTINE DEPENDENCE: ICD-10-CM

## 2019-11-01 DIAGNOSIS — I25.10 ATHEROSCLEROTIC HEART DISEASE OF NATIVE CORONARY ARTERY WITHOUT ANGINA PECTORIS: ICD-10-CM

## 2019-11-01 DIAGNOSIS — I65.23 OCCLUSION AND STENOSIS OF BILATERAL CAROTID ARTERIES: ICD-10-CM

## 2019-11-01 DIAGNOSIS — Z91.81 HISTORY OF FALLING: ICD-10-CM

## 2019-11-01 DIAGNOSIS — I50.32 CHRONIC DIASTOLIC (CONGESTIVE) HEART FAILURE: ICD-10-CM

## 2019-11-01 DIAGNOSIS — S01.01XA LACERATION WITHOUT FOREIGN BODY OF SCALP, INITIAL ENCOUNTER: ICD-10-CM

## 2019-11-01 DIAGNOSIS — N18.9 CHRONIC KIDNEY DISEASE, UNSPECIFIED: ICD-10-CM

## 2019-11-01 DIAGNOSIS — I13.0 HYPERTENSIVE HEART AND CHRONIC KIDNEY DISEASE WITH HEART FAILURE AND STAGE 1 THROUGH STAGE 4 CHRONIC KIDNEY DISEASE, OR UNSPECIFIED CHRONIC KIDNEY DISEASE: ICD-10-CM

## 2019-11-01 DIAGNOSIS — G30.9 ALZHEIMER'S DISEASE, UNSPECIFIED: ICD-10-CM

## 2019-11-01 DIAGNOSIS — Z66 DO NOT RESUSCITATE: ICD-10-CM

## 2019-11-01 DIAGNOSIS — N17.9 ACUTE KIDNEY FAILURE, UNSPECIFIED: ICD-10-CM

## 2019-11-01 DIAGNOSIS — I08.9 RHEUMATIC MULTIPLE VALVE DISEASE, UNSPECIFIED: ICD-10-CM
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
## 2019-11-01 DIAGNOSIS — F02.80 DEMENTIA IN OTHER DISEASES CLASSIFIED ELSEWHERE, UNSPECIFIED SEVERITY, WITHOUT BEHAVIORAL DISTURBANCE, PSYCHOTIC DISTURBANCE, MOOD DISTURBANCE, AND ANXIETY: ICD-10-CM

## 2019-11-01 DIAGNOSIS — R55 SYNCOPE AND COLLAPSE: ICD-10-CM

## 2019-11-01 DIAGNOSIS — D64.9 ANEMIA, UNSPECIFIED: ICD-10-CM

## 2019-11-01 DIAGNOSIS — Z95.1 PRESENCE OF AORTOCORONARY BYPASS GRAFT: Chronic | ICD-10-CM
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
## 2019-11-01 DIAGNOSIS — Z95.2 PRESENCE OF PROSTHETIC HEART VALVE: ICD-10-CM

## 2019-11-01 LAB
ALBUMIN SERPL ELPH-MCNC: 3.1 G/DL — LOW (ref 3.3–5)
ALP SERPL-CCNC: 76 U/L — SIGNIFICANT CHANGE UP (ref 40–120)
ALT FLD-CCNC: 16 U/L — SIGNIFICANT CHANGE UP (ref 12–78)
ANION GAP SERPL CALC-SCNC: 8 MMOL/L — SIGNIFICANT CHANGE UP (ref 5–17)
APTT BLD: 31.4 SEC — SIGNIFICANT CHANGE UP (ref 27.5–36.3)
AST SERPL-CCNC: 14 U/L — LOW (ref 15–37)
BASOPHILS # BLD AUTO: 0.06 K/UL — SIGNIFICANT CHANGE UP (ref 0–0.2)
BASOPHILS NFR BLD AUTO: 0.9 % — SIGNIFICANT CHANGE UP (ref 0–2)
BILIRUB SERPL-MCNC: 0.6 MG/DL — SIGNIFICANT CHANGE UP (ref 0.2–1.2)
BUN SERPL-MCNC: 47 MG/DL — HIGH (ref 7–23)
CALCIUM SERPL-MCNC: 8.7 MG/DL — SIGNIFICANT CHANGE UP (ref 8.5–10.1)
CHLORIDE SERPL-SCNC: 110 MMOL/L — HIGH (ref 96–108)
CO2 SERPL-SCNC: 25 MMOL/L — SIGNIFICANT CHANGE UP (ref 22–31)
CREAT SERPL-MCNC: 2 MG/DL — HIGH (ref 0.5–1.3)
EOSINOPHIL # BLD AUTO: 0.3 K/UL — SIGNIFICANT CHANGE UP (ref 0–0.5)
EOSINOPHIL NFR BLD AUTO: 4.6 % — SIGNIFICANT CHANGE UP (ref 0–6)
GLUCOSE SERPL-MCNC: 163 MG/DL — HIGH (ref 70–99)
HCT VFR BLD CALC: 29.8 % — LOW (ref 39–50)
HGB BLD-MCNC: 9.5 G/DL — LOW (ref 13–17)
IMM GRANULOCYTES NFR BLD AUTO: 0.3 % — SIGNIFICANT CHANGE UP (ref 0–1.5)
INR BLD: 1.15 RATIO — SIGNIFICANT CHANGE UP (ref 0.88–1.16)
LYMPHOCYTES # BLD AUTO: 0.86 K/UL — LOW (ref 1–3.3)
LYMPHOCYTES # BLD AUTO: 13.1 % — SIGNIFICANT CHANGE UP (ref 13–44)
MAGNESIUM SERPL-MCNC: 2.1 MG/DL — SIGNIFICANT CHANGE UP (ref 1.6–2.6)
MCHC RBC-ENTMCNC: 31.6 PG — SIGNIFICANT CHANGE UP (ref 27–34)
MCHC RBC-ENTMCNC: 31.9 GM/DL — LOW (ref 32–36)
MCV RBC AUTO: 99 FL — SIGNIFICANT CHANGE UP (ref 80–100)
MONOCYTES # BLD AUTO: 0.64 K/UL — SIGNIFICANT CHANGE UP (ref 0–0.9)
MONOCYTES NFR BLD AUTO: 9.7 % — SIGNIFICANT CHANGE UP (ref 2–14)
NEUTROPHILS # BLD AUTO: 4.7 K/UL — SIGNIFICANT CHANGE UP (ref 1.8–7.4)
NEUTROPHILS NFR BLD AUTO: 71.4 % — SIGNIFICANT CHANGE UP (ref 43–77)
NT-PROBNP SERPL-SCNC: 9770 PG/ML — HIGH (ref 0–450)
PLATELET # BLD AUTO: 121 K/UL — LOW (ref 150–400)
POTASSIUM SERPL-MCNC: 4.2 MMOL/L — SIGNIFICANT CHANGE UP (ref 3.5–5.3)
POTASSIUM SERPL-SCNC: 4.2 MMOL/L — SIGNIFICANT CHANGE UP (ref 3.5–5.3)
PROT SERPL-MCNC: 6.8 GM/DL — SIGNIFICANT CHANGE UP (ref 6–8.3)
PROTHROM AB SERPL-ACNC: 12.8 SEC — SIGNIFICANT CHANGE UP (ref 10–12.9)
RBC # BLD: 3.01 M/UL — LOW (ref 4.2–5.8)
RBC # FLD: 14.9 % — HIGH (ref 10.3–14.5)
SODIUM SERPL-SCNC: 143 MMOL/L — SIGNIFICANT CHANGE UP (ref 135–145)
TROPONIN I SERPL-MCNC: 0.07 NG/ML — HIGH (ref 0.01–0.04)
WBC # BLD: 6.58 K/UL — SIGNIFICANT CHANGE UP (ref 3.8–10.5)
WBC # FLD AUTO: 6.58 K/UL — SIGNIFICANT CHANGE UP (ref 3.8–10.5)

## 2019-11-01 PROCEDURE — 99285 EMERGENCY DEPT VISIT HI MDM: CPT

## 2019-11-01 PROCEDURE — 93010 ELECTROCARDIOGRAM REPORT: CPT

## 2019-11-01 PROCEDURE — 71045 X-RAY EXAM CHEST 1 VIEW: CPT | Mod: 26

## 2019-11-01 PROCEDURE — 70450 CT HEAD/BRAIN W/O DYE: CPT | Mod: 26

## 2019-11-01 RX ORDER — ALPRAZOLAM 0.25 MG
1 TABLET ORAL
Qty: 0 | Refills: 0 | DISCHARGE

## 2019-11-01 RX ORDER — MEMANTINE HYDROCHLORIDE 10 MG/1
10 TABLET ORAL
Refills: 0 | Status: DISCONTINUED | OUTPATIENT
Start: 2019-11-01 | End: 2019-11-03

## 2019-11-01 RX ORDER — ASPIRIN/CALCIUM CARB/MAGNESIUM 324 MG
81 TABLET ORAL DAILY
Refills: 0 | Status: DISCONTINUED | OUTPATIENT
Start: 2019-11-01 | End: 2019-11-03

## 2019-11-01 RX ORDER — HEPARIN SODIUM 5000 [USP'U]/ML
5000 INJECTION INTRAVENOUS; SUBCUTANEOUS EVERY 12 HOURS
Refills: 0 | Status: DISCONTINUED | OUTPATIENT
Start: 2019-11-01 | End: 2019-11-03

## 2019-11-01 RX ORDER — ASPIRIN/CALCIUM CARB/MAGNESIUM 324 MG
325 TABLET ORAL ONCE
Refills: 0 | Status: COMPLETED | OUTPATIENT
Start: 2019-11-01 | End: 2019-11-01

## 2019-11-01 RX ORDER — CHOLECALCIFEROL (VITAMIN D3) 125 MCG
1 CAPSULE ORAL
Qty: 0 | Refills: 0 | DISCHARGE

## 2019-11-01 RX ORDER — DONEPEZIL HYDROCHLORIDE 10 MG/1
5 TABLET, FILM COATED ORAL AT BEDTIME
Refills: 0 | Status: DISCONTINUED | OUTPATIENT
Start: 2019-11-01 | End: 2019-11-03

## 2019-11-01 RX ORDER — CARVEDILOL PHOSPHATE 80 MG/1
6.25 CAPSULE, EXTENDED RELEASE ORAL EVERY 12 HOURS
Refills: 0 | Status: DISCONTINUED | OUTPATIENT
Start: 2019-11-01 | End: 2019-11-03

## 2019-11-01 RX ORDER — TAMSULOSIN HYDROCHLORIDE 0.4 MG/1
0.4 CAPSULE ORAL AT BEDTIME
Refills: 0 | Status: DISCONTINUED | OUTPATIENT
Start: 2019-11-01 | End: 2019-11-03

## 2019-11-01 RX ORDER — ATORVASTATIN CALCIUM 80 MG/1
40 TABLET, FILM COATED ORAL AT BEDTIME
Refills: 0 | Status: DISCONTINUED | OUTPATIENT
Start: 2019-11-01 | End: 2019-11-03

## 2019-11-01 RX ORDER — FERROUS SULFATE 325(65) MG
325 TABLET ORAL DAILY
Refills: 0 | Status: DISCONTINUED | OUTPATIENT
Start: 2019-11-02 | End: 2019-11-03

## 2019-11-01 RX ORDER — FUROSEMIDE 40 MG
1 TABLET ORAL
Qty: 0 | Refills: 0 | DISCHARGE

## 2019-11-01 RX ORDER — MULTIVIT-MIN/FERROUS GLUCONATE 9 MG/15 ML
1 LIQUID (ML) ORAL
Qty: 0 | Refills: 0 | DISCHARGE

## 2019-11-01 RX ORDER — SODIUM CHLORIDE 9 MG/ML
500 INJECTION INTRAMUSCULAR; INTRAVENOUS; SUBCUTANEOUS ONCE
Refills: 0 | Status: COMPLETED | OUTPATIENT
Start: 2019-11-01 | End: 2019-11-01

## 2019-11-01 RX ORDER — LEVOTHYROXINE SODIUM 125 MCG
50 TABLET ORAL DAILY
Refills: 0 | Status: DISCONTINUED | OUTPATIENT
Start: 2019-11-01 | End: 2019-11-03

## 2019-11-01 RX ADMIN — Medication 325 MILLIGRAM(S): at 16:56

## 2019-11-01 RX ADMIN — SODIUM CHLORIDE 500 MILLILITER(S): 9 INJECTION INTRAMUSCULAR; INTRAVENOUS; SUBCUTANEOUS at 15:30

## 2019-11-01 NOTE — ED ADULT NURSE NOTE - NSIMPLEMENTINTERV_GEN_ALL_ED
Implemented All Fall with Harm Risk Interventions:  [place] to call system. Call bell, personal items and telephone within reach. Instruct patient to call for assistance. Room bathroom lighting operational. Non-slip footwear when patient is off stretcher. Physically safe environment: no spills, clutter or unnecessary equipment. Stretcher in lowest position, wheels locked, appropriate side rails in place. Provide visual cue, wrist band, yellow gown, etc. Monitor gait and stability. Monitor for mental status changes and reorient to person, place, and time. Review medications for side effects contributing to fall risk. Reinforce activity limits and safety measures with patient and family. Provide visual clues: red socks.

## 2019-11-01 NOTE — H&P ADULT - NSHPPHYSICALEXAM_GEN_ALL_CORE
ICU Vital Signs Last 24 Hrs  T(C): 36.5 (01 Nov 2019 15:35), Max: 36.5 (01 Nov 2019 15:35)  T(F): 97.7 (01 Nov 2019 15:35), Max: 97.7 (01 Nov 2019 15:35)  HR: 63 (01 Nov 2019 15:35) (63 - 64)  BP: 108/55 (01 Nov 2019 16:49) (98/47 - 119/48)    RR: 19 (01 Nov 2019 15:19) (19 - 19)  SpO2: 92% (01 Nov 2019 15:19) (92% - 92%)

## 2019-11-01 NOTE — ED ADULT TRIAGE NOTE - CHIEF COMPLAINT QUOTE
pt BIBEMS from home s/p near syncopal episode while on toilet. wife called son after she found patient w/ AMS on toilet. per son, pt at baseline upon arrival to ED. a&o2. no fall or injury. recently seen in [facility] 2 days ago for fall.

## 2019-11-01 NOTE — H&P ADULT - HISTORY OF PRESENT ILLNESS
89 y/o male with a PMHx of artificial pacemaker, CAD s/p CABG, CHF, Alzheimer's disease presents to the ED BIBEMS after episode of confusion this afternoon. Wife at bedside, reports pt fell 2 days ago and had work-up at [facility]. This afternoon pt had an episode where he could not recognize his family member. Denies CP, SOB, abd pain. Wife, did not witness episode, states she was arriving home when EMS was putting pt in ambulance. Pt was known well yesterday and this morning. Pt is a 89 y/o male with a PMHx of artificial pacemaker, CAD s/p CABG, Diastolic  CHF, Alzheimer's disease who presents to the ED via EMS after an episode of hypotension and confusion at 2:30pm this afternoon. Pt 's systolic blood pressure at home was 80 mmHg and pt did not recognize his family at home. EMS gave support with O2 and pt responded well.    Previously pt had a mechanical  fall backwards  2 days ago and came to [facility], where he was evaluated and required a few sutures on his scalp.  Pt's son reports he has been sleeping a little more than usual and has been taking less po intake since his recent fall.  Pt himself denies chest pain/palpitations, no  SOB/abd pain.    No resp or urinary complaints. No fever/chills.     Fam Hx:  Father HTN,  at 70 of PNA  Mother HTN,  at 92

## 2019-11-01 NOTE — ED ADULT NURSE NOTE - CAS DISCH ACCOMP BY
Blanquita is advised to contact  for Possitive c dif.   No - the patient is unable to be screened due to medical condition Family

## 2019-11-01 NOTE — H&P ADULT - NSHPSOCIALHISTORY_GEN_ALL_CORE
Pt lives with his wife and he uses a walker to ambulate.  Former 20pack year smoking until age 40.  Pt has rare ETOH.   He is retired from working in advertising.

## 2019-11-01 NOTE — ED PROVIDER NOTE - CLINICAL SUMMARY MEDICAL DECISION MAKING FREE TEXT BOX
EKG paced.  Slight troponin elevation.  BNP elevated, but does not appear fluid overloaded, more dry.  Hemoglobin 9.5, guaiac negative.  CXR cardiomegaly.  CT head unchanged.  Cr slightly elevated from 2 days ago, given IVF.  Unable to interrogate PPM.  Plan for admission, tele obs.

## 2019-11-01 NOTE — H&P ADULT - REASON FOR ADMISSION
Near syncope  PPM Hx  Increased confusion  Troponin elevation, CAD  Dehydration  Fall 2 days PTA Near syncope, hypotension  PPM Hx  Increased confusion  Troponin elevation, CAD  Dehydration  Fall 2 days PTA

## 2019-11-01 NOTE — H&P ADULT - ASSESSMENT
Pt is a 89 y/o male with a PMHx of artificial pacemaker, CAD s/p CABG, Diastolic  CHF, Alzheimer's disease with recent decreased po intake after a fall,  now  admitted w/  Near syncope, hypotension, DDX arrythmia, orthostatic hypotension, polypharmacy  PPM Hx  Increased confusion  Troponin elevation, CAD  Dehydration, ARF  Mild Increase in Chronic anemia  Fall 2 days PTA  - adm to observation   - s/p 500ml Normal Saline in the ED  - hold lasix today.  Pt's son Jonn reports pt may be taking lasix for CHF hx.  He will review meds at home with pts wife to check for incorrect administration or duplicate administration of bp meds.   - repeat labs  - cardiology consult, consider PPM eval  - hypotension, will decrease coreg 12.5 bid to 6.25 BID for tonight  - Advanced directives, discussed with pt's son, HCP, Jonn at bedside.  Pt's son will complete [facility] after further discussion with his family.  He is interested in signing a DNR after review with his family.  - DVT prophylaxis : heparin  - IMPROVE VTE Individual Risk Assessment    RISK                                                                Points    [  ] Previous VTE                                                  3    [  ] Thrombophilia                                               2    [  ] Lower limb paralysis                                      2        (unable to hold up >15 seconds)      [  ] Current Cancer                                              2         (within 6 months)    [?  ] Immobilization > 24 hrs                                1    [  ] ICU/CCU stay > 24 hours                              1    [X  ] Age > 60                                                      1    IMPROVE VTE Score _____1-2____    IMPROVE Score 0-1: Low Risk, No VTE prophylaxis required for most patients, encourage ambulation.   IMPROVE Score 2-3: At risk, pharmacologic VTE prophylaxis is indicated for most patients (in the absence of a contraindication)  IMPROVE Score > or = 4: High Risk, pharmacologic VTE prophylaxis is indicated for most patients (in the absence of a contraindication)

## 2019-11-01 NOTE — ED ADULT NURSE NOTE - CHIEF COMPLAINT QUOTE
pt BIBEMS from home s/p near syncopal episode while on toilet. wife called son after she found patient w/ AMS on toilet. per son, pt at baseline upon arrival to ED. a&o2. no fall or injury. recently seen in [facility] 2 days ago for fall.

## 2019-11-01 NOTE — ED PROVIDER NOTE - PROGRESS NOTE DETAILS
Scribe CD for ED attending, Doctor Robel. Rectal exam- brown stool. Guaiac negative. QC reactive. Lot [id number]. Expiration 12/31/19. I Tammy Blum attest that this documentation has been prepared under the direction and in the presence of Doctor Huston.

## 2019-11-01 NOTE — ED PROVIDER NOTE - OBJECTIVE STATEMENT
89 y/o male with a PMHx of artificial pacemaker, CAD s/p CABG, CHF, Alzheimer's disease presents to the ED BIBEMS after episode of confusion this afternoon. Wife at bedside, reports pt fell 2 days ago and had work-up at [facility]. This afternoon pt had an episode where he could not recognize his family member. Denies CP, SOB, abd pain. Wife, did not witness episode, states she was arriving home when EMS was putting pt in ambulance. Pt was known well yesterday and this morning. PCP: [facility].

## 2019-11-01 NOTE — ED ADULT NURSE NOTE - OBJECTIVE STATEMENT
Pt BIBEMS for AMS as per daughter. Pt has been acting differently. Pt had altered speech for a short period of time. Pt was seen in ED 2 days ago for fall. Positive head injury, no bleed noted 2 days ago. Family present at bedside.

## 2019-11-01 NOTE — H&P ADULT - NSHPLABSRESULTS_GEN_ALL_CORE
EKG: V paced 63 bpm, T w inv in I, avL, qs in II, III, avF, v1v2v3      < from: Transthoracic Echocardiogram Follow Up (01.07.19 @ 14:35) >   Impression     Summary   Fibrocalcific changes noted to the mitral valve leaflets with preserved   leaflet excursion.   Moderate mitral annular calcification is present.   Mild (1+) mitral regurgitation is present.     Moderate (2+) tricuspid valve regurgitation is present.   Normal appearing pulmonic valve structure and function.   The left atrium is moderately dilated.   Moderate concentric left ventricular hypertrophy is present.   Estimated left ventricular ejection fraction is 55 %.   The right atrium appears moderately dilated.   A device wire is seen in the RV and RA.   No evidence of pericardial effusion.   Pleural effusion cannot be ruled out.   Bioprosthetic aortic valve leaflets are thickened.   Moderate stenosis noted   Mild regurgitation.     Signature   ----------------------------------------------------------------   Electronically signed by Edouard Aldana MD(Interpreting   physician) on 01/07/2019 04:18 PM   -----------------------------------------------  < end of copied text >

## 2019-11-01 NOTE — ED PROVIDER NOTE - CARE PLAN
Principal Discharge DX:	Near syncope  Secondary Diagnosis:	AMS (altered mental status)  Secondary Diagnosis:	Elevated troponin

## 2019-11-02 LAB
HCT VFR BLD CALC: 28 % — LOW (ref 39–50)
HGB BLD-MCNC: 9.1 G/DL — LOW (ref 13–17)
MCHC RBC-ENTMCNC: 32.2 PG — SIGNIFICANT CHANGE UP (ref 27–34)
MCHC RBC-ENTMCNC: 32.5 GM/DL — SIGNIFICANT CHANGE UP (ref 32–36)
MCV RBC AUTO: 98.9 FL — SIGNIFICANT CHANGE UP (ref 80–100)
OB PNL STL: NEGATIVE — SIGNIFICANT CHANGE UP
PLATELET # BLD AUTO: 120 K/UL — LOW (ref 150–400)
RBC # BLD: 2.83 M/UL — LOW (ref 4.2–5.8)
RBC # FLD: 15.2 % — HIGH (ref 10.3–14.5)
TROPONIN I SERPL-MCNC: 0.07 NG/ML — HIGH (ref 0.01–0.04)
WBC # BLD: 6.86 K/UL — SIGNIFICANT CHANGE UP (ref 3.8–10.5)
WBC # FLD AUTO: 6.86 K/UL — SIGNIFICANT CHANGE UP (ref 3.8–10.5)

## 2019-11-02 RX ADMIN — MEMANTINE HYDROCHLORIDE 10 MILLIGRAM(S): 10 TABLET ORAL at 01:40

## 2019-11-02 RX ADMIN — HEPARIN SODIUM 5000 UNIT(S): 5000 INJECTION INTRAVENOUS; SUBCUTANEOUS at 06:25

## 2019-11-02 RX ADMIN — Medication 50 MICROGRAM(S): at 06:25

## 2019-11-02 RX ADMIN — TAMSULOSIN HYDROCHLORIDE 0.4 MILLIGRAM(S): 0.4 CAPSULE ORAL at 21:09

## 2019-11-02 RX ADMIN — Medication 325 MILLIGRAM(S): at 09:53

## 2019-11-02 RX ADMIN — ATORVASTATIN CALCIUM 40 MILLIGRAM(S): 80 TABLET, FILM COATED ORAL at 21:08

## 2019-11-02 RX ADMIN — MEMANTINE HYDROCHLORIDE 10 MILLIGRAM(S): 10 TABLET ORAL at 17:14

## 2019-11-02 RX ADMIN — CARVEDILOL PHOSPHATE 6.25 MILLIGRAM(S): 80 CAPSULE, EXTENDED RELEASE ORAL at 17:14

## 2019-11-02 RX ADMIN — MEMANTINE HYDROCHLORIDE 10 MILLIGRAM(S): 10 TABLET ORAL at 06:25

## 2019-11-02 RX ADMIN — DONEPEZIL HYDROCHLORIDE 5 MILLIGRAM(S): 10 TABLET, FILM COATED ORAL at 01:40

## 2019-11-02 RX ADMIN — HEPARIN SODIUM 5000 UNIT(S): 5000 INJECTION INTRAVENOUS; SUBCUTANEOUS at 17:15

## 2019-11-02 RX ADMIN — CARVEDILOL PHOSPHATE 6.25 MILLIGRAM(S): 80 CAPSULE, EXTENDED RELEASE ORAL at 06:25

## 2019-11-02 RX ADMIN — Medication 81 MILLIGRAM(S): at 09:53

## 2019-11-02 RX ADMIN — DONEPEZIL HYDROCHLORIDE 5 MILLIGRAM(S): 10 TABLET, FILM COATED ORAL at 21:09

## 2019-11-02 NOTE — CONSULT NOTE ADULT - REASON FOR ADMISSION
Near syncope, hypotension  PPM Hx  Increased confusion  Troponin elevation, CAD  Dehydration  Fall 2 days PTA

## 2019-11-02 NOTE — PROGRESS NOTE ADULT - SUBJECTIVE AND OBJECTIVE BOX
INTERVAL HPI/OVERNIGHT EVENTS:  Pt is a 89 y/o male with a PMHx of artificial pacemaker, CAD s/p CABG, Diastolic  CHF, Alzheimer's disease who presents to the ED via EMS after an episode of hypotension and confusion at 2:30pm this afternoon. Pt 's systolic blood pressure at home was 80 mmHg and pt did not recognize his family at home. EMS gave support with O2 and pt responded well.    Previously pt had a mechanical  fall backwards  2 days ago and came to [facility], where he was evaluated and required a few sutures on his scalp.  Pt's son reports he has been sleeping a little more than usual and has been taking less po intake since his recent fall.  Pt himself denies chest pain/palpitations, no  SOB/abd pain.    No resp or urinary complaints. No fever/chills.     11/2/19- Patient seen and examined at bedside. States he feels well. Slightly confused, as per son at bedside, patient's short term memory is poor. Patient states he feels well, denies any more dizziness/lightheadedness. BP stable while in hospital. Discussed with Cardio, patient should have PPM checked. Also stated patient with downward trend in Hgb on outpatient labs and inpatient- however patient declines noticing any blood in stool- FOBT negative    MEDICATIONS  (STANDING):  aspirin enteric coated 81 milliGRAM(s) Oral daily  atorvastatin 40 milliGRAM(s) Oral at bedtime  carvedilol 6.25 milliGRAM(s) Oral every 12 hours  donepezil 5 milliGRAM(s) Oral at bedtime  ferrous    sulfate 325 milliGRAM(s) Oral daily  heparin  Injectable 5000 Unit(s) SubCutaneous every 12 hours  levothyroxine 50 MICROGram(s) Oral daily  memantine 10 milliGRAM(s) Oral two times a day  tamsulosin 0.4 milliGRAM(s) Oral at bedtime    MEDICATIONS  (PRN):      Allergies    No Known Allergies    Intolerances      ROS:  CONSTITUTIONAL: No weakness, fevers or chills  EYES/ENT: No visual changes;  No vertigo or throat pain   NECK: No pain or stiffness  RESPIRATORY: No cough, wheezing, hemoptysis; No shortness of breath  CARDIOVASCULAR: No chest pain or palpitations  GASTROINTESTINAL: No abdominal or epigastric pain. No nausea, vomiting, or hematemesis; No diarrhea or constipation. No melena or hematochezia.  GENITOURINARY: No dysuria, frequency or hematuria  NEUROLOGICAL: No numbness or weakness  SKIN: No itching, burning, rashes, or lesions   All other review of systems is negative unless indicated above.    Vital Signs Last 24 Hrs  T(C): 36.2 (02 Nov 2019 11:46), Max: 36.5 (02 Nov 2019 03:34)  T(F): 97.1 (02 Nov 2019 11:46), Max: 97.7 (02 Nov 2019 03:34)  HR: 60 (02 Nov 2019 11:46) (60 - 63)  BP: 134/66 (02 Nov 2019 11:46) (108/55 - 147/61)  BP(mean): --  RR: 18 (02 Nov 2019 11:46) (17 - 18)  SpO2: 96% (02 Nov 2019 11:46) (94% - 98%)    Physical Exam:  General: WN/WD NAD  Neurology: A&Ox3, nonfocal, RENDON x 4  Respiratory: CTA B/L  CV: RRR, S1S2  Abdominal: Soft, NT, ND +BS  Extremities: No edema, + peripheral pulses      LABS:                        9.1    6.86  )-----------( 120      ( 02 Nov 2019 09:39 )             28.0     11-02    141  |  113<H>  |  46<H>  ----------------------------<  95  4.0   |  20<L>  |  1.73<H>    Ca    8.4<L>      02 Nov 2019 07:51  Mg     2.1     11-01    TPro  6.8  /  Alb  3.1<L>  /  TBili  0.6  /  DBili  x   /  AST  14<L>  /  ALT  16  /  AlkPhos  76  11-01    PT/INR - ( 01 Nov 2019 15:45 )   PT: 12.8 sec;   INR: 1.15 ratio         PTT - ( 01 Nov 2019 15:45 )  PTT:31.4 sec      RADIOLOGY & ADDITIONAL TESTS:

## 2019-11-02 NOTE — CONSULT NOTE ADULT - ASSESSMENT
Syncope recently with scalp trauma - r/o hypovolemia (anemia, dehydration) vs arrhythmias  Hypotension - probably sec to hypovolemia, anemia  Currently near syncope, dizziness  Anemia - new  Acute on Chr / CKD  CAD, s/p CABG  AF, SSS, s/p PPM.   HTN by history    Suggest::    Tele  PPM check to r/o ventricular arrhythmias  Reduce Coreg dose  Hold Lasix  Follow BP  Follow renal function and lytes  Anemia work up

## 2019-11-02 NOTE — CONSULT NOTE ADULT - SUBJECTIVE AND OBJECTIVE BOX
Patient is a 89y old  Male who presents with a chief complaint of Near syncope, hypotension  PPM Hx  Increased confusion  Troponin elevation, CAD  Dehydration  Fall 2 days PTA (2019 18:54)      HPI:  Pt is a 89 y/o male with a PMHx of artificial pacemaker, CAD s/p CABG, Diastolic  CHF, Alzheimer's disease who presents to the ED via EMS after an episode of hypotension and confusion at 2:30pm this afternoon. Pt 's systolic blood pressure at home was 80 mmHg and pt did not recognize his family at home. EMS gave support with O2 and pt responded well.    Previously pt had a mechanical  fall backwards  2 days ago and came to [facility], where he was evaluated and required a few sutures on his scalp.  Pt's son reports he has been sleeping a little more than usual and has been taking less po intake since his recent fall.  Pt himself denies chest pain/palpitations, no  SOB/abd pain.    No resp or urinary complaints. No fever/chills.     Fam Hx:  Father HTN,  at 70 of PNA  Mother HTN,  at 92 (2019 18:54)      PAST MEDICAL & SURGICAL HISTORY:  CHF (congestive heart failure)  Artificial pacemaker  S/P CABG (coronary artery bypass graft)      PREVIOUS CARDIAC WORKUP:      Echo:  10/18  --Technically difficult study.  --There is severe left atrial dilatation (LA volume index 59 ml/m²).  --There is moderate left ventricular hypertrophy.  --Global LV wall motion is mildly hypokinetic.  --LV ejection fraction is borderline normal.  --There is right atrial dilatation.  --There is a pacemaker in the right heart.  --The aortic root is normal in size (3.7 cm). 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  --There is a bioprosthesis in the aortic valve position. The function of the aortic valve prosthesis is normal. There is mild aortic regurgitation.  --There is mitral annular calcification. There is mild mitral valve thickening. There is mild to moderate mitral regurgitation.  --There is moderate tricuspid regurgitation.  --There is mild pulmonic regurgitation.  --The right atrial pressure is 6 - 10 mm Hg. There is moderate pulmonary hypertension.  --There is no pericardial effusion.       ALLERGIES:    No Known Allergies       MEDICATIONS  (STANDING):  aspirin enteric coated 81 milliGRAM(s) Oral daily  atorvastatin 40 milliGRAM(s) Oral at bedtime  carvedilol 6.25 milliGRAM(s) Oral every 12 hours  donepezil 5 milliGRAM(s) Oral at bedtime  ferrous    sulfate 325 milliGRAM(s) Oral daily  heparin  Injectable 5000 Unit(s) SubCutaneous every 12 hours  levothyroxine 50 MICROGram(s) Oral daily  memantine 10 milliGRAM(s) Oral two times a day  tamsulosin 0.4 milliGRAM(s) Oral at bedtime    MEDICATIONS  (PRN):      FAMILY HISTORY:        SOCIAL HISTORY:  .scl     ROS:     .ros    Vital Signs Last 24 Hrs  T(C): 36.5 (2019 06:30), Max: 36.5 (2019 15:35)  T(F): 97.7 (2019 06:30), Max: 97.7 (2019 15:35)  HR: 60 (2019 06:30) (60 - 64)  BP: 147/61 (2019 06:30) (98/47 - 147/61)  BP(mean): --  RR: 18 (2019 06:30) (17 - 19)  SpO2: 98% (2019 06:30) (92% - 98%)    I&O's Summary      PHYSICAL EXAM:    .phy      TELEMETRY:    ECG:    LABS:                          9.1    6.86  )-----------( 120      ( 2019 09:39 )             28.0         141  |  113<H>  |  46<H>  ----------------------------<  95  4.0   |  20<L>  |  1.73<H>    Ca    8.4<L>      2019 07:51  Mg     2.1         TPro  6.8  /  Alb  3.1<L>  /  TBili  0.6  /  DBili  x   /  AST  14<L>  /  ALT  16  /  AlkPhos  76   @ 07:51  Trop-I  0.067     @ 15:45  Trop-I  0.075    Pro BNP  9770  @ 15:45    PT/INR - ( 2019 15:45 )   PT: 12.8 sec;   INR: 1.15 ratio    PTT - ( 2019 15:45 )  PTT:31.4 sec    RADIOLOGY & ADDITIONAL STUDIES:    Xray Chest 1 View- PORTABLE-Urgent (19 @ 17:04) >  Impression: No active pulmonary disease. Cardiomegaly. Chief complaint of Near syncope, hypotension      HPI:  89-year-old male admitted with complaints of dizziness, low blood pressure. He was noted to be hypotensive and confused at home. Systolic blood pressure was 80 mmHg the patient did not recognize his family members at home. Recent fall probably because of syncope. Patient has an occipital laceration but does not remember how he got it. Poor oral intake since his fall. Patient himself denies any complaints of chest pain, palpitations, shortness of breath. He feels fine at this time. His noncompliance to medications. Minimal elevation of troponin noted during this hospitalization.      PAST MEDICAL & SURGICAL HISTORY:    •	Atrial fibrillation	 	  •	Broken ribs	 	   	Fell and broken ribs, hospital for 4 days.	  •	CAD (coronary artery disease)	2003	   	S/P CABG. 	  •	Carotid arterial disease	 	   	B/L ICA 1-39%; Last Carotid 9/2014	  •	Chronic diastolic CHF (congestive heart failure)	 	  •	Current non-smoker but past smoking history unknown	 	  •	Former smoker	 	  •	Hyperlipidemia	 	  •	Hypertension	 	   	Last Stress Test 5/2008	  •	LV dysfunction	 	   	Last Echo 8/17/2015	  •	Mitral annular calcification	 	  •	MR (mitral regurgitation)	 	   	mild to moderate	  •	Nonischemic cardiomyopathy	 	  •	S/P AVR (aortic valve replacement)	2003	   	Bioprosthetic	  •	S/P placement of cardiac pacemaker	April 29 2014	   	[employer]	  •	SSS (sick sinus syndrome)	 	  •	TR (tricuspid regurgitation)	 	   	Mild	      PREVIOUS CARDIAC WORKUP:      Echo:  10/18  --Technically difficult study.  --There is severe left atrial dilatation (LA volume index 59 ml/m²).  --There is moderate left ventricular hypertrophy.  --Global LV wall motion is mildly hypokinetic.  --LV ejection fraction is borderline normal.  --There is right atrial dilatation.  --There is a pacemaker in the right heart.  --The aortic root is normal in size (3.7 cm). 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  --There is a bioprosthesis in the aortic valve position. The function of the aortic valve prosthesis is normal. There is mild aortic regurgitation.  --There is mitral annular calcification. There is mild mitral valve thickening. There is mild to moderate mitral regurgitation.  --There is moderate tricuspid regurgitation.  --There is mild pulmonic regurgitation.  --The right atrial pressure is 6 - 10 mm Hg. There is moderate pulmonary hypertension.  --There is no pericardial effusion.       ALLERGIES:    No Known Allergies       MEDICATIONS  (STANDING):  aspirin enteric coated 81 milliGRAM(s) Oral daily  atorvastatin 40 milliGRAM(s) Oral at bedtime  carvedilol 6.25 milliGRAM(s) Oral every 12 hours  donepezil 5 milliGRAM(s) Oral at bedtime  ferrous    sulfate 325 milliGRAM(s) Oral daily  heparin  Injectable 5000 Unit(s) SubCutaneous every 12 hours  levothyroxine 50 MICROGram(s) Oral daily  memantine 10 milliGRAM(s) Oral two times a day  tamsulosin 0.4 milliGRAM(s) Oral at bedtime      FAMILY HISTORY:  NC      SOCIAL HISTORY:  Nonsmoker. No ETOH abuse. No illicit drugs.     ROS:     Detailed ten system ROS was performed and was negative except for history as eluded to above.    no fever  no chills  no nausea  no vomiting  no diarrhea  no constipation  no melena  no hematochezia  no chest pain  no palpitations  no sob at rest  no dyspnea on exertion  no cough  no wheezing  no anorexia  no headache  + dizziness  + syncope  + weakness  no myalgia  no dysuria  no polyuria  no hematuria       Vital Signs Last 24 Hrs  T(C): 36.5 (02 Nov 2019 06:30), Max: 36.5 (01 Nov 2019 15:35)  T(F): 97.7 (02 Nov 2019 06:30), Max: 97.7 (01 Nov 2019 15:35)  HR: 60 (02 Nov 2019 06:30) (60 - 64)  BP: 147/61 (02 Nov 2019 06:30) (98/47 - 147/61)  RR: 18 (02 Nov 2019 06:30) (17 - 19)  SpO2: 98% (02 Nov 2019 06:30) (92% - 98%)    I&O's Summary    PHYSICAL EXAM:    General:                Comfortable, AAO X 3, in no distress.   HEENT:                  R occipital laceration, PERRLA, EOMI, conjunctiva clear. Pallor  Neck:                     Supple, no adenopathy, no thyromegaly, no JVD, no bruit.  Back:                     Symmetric, non tender.  Chest:                    Clear, B/L symmetric air entry, no tachypnea  Heart:                     S1, S2 normal, no gallop, + murmur.  Abdomen:              Soft, non-tender, bowel sounds active. No palpable masses.  Extremities:           no cyanosis, no edema. Peripheral pulses normal.  Skin:                      Skin color, texture normal. No rashes.  Neurologic:            Grossly nonfocal.       TELEMETRY:  V paced - no arrhythmias    ECG: V pcaed    LABS:                          9.1    6.86  )-----------( 120      ( 02 Nov 2019 09:39 )             28.0     11-02    141  |  113<H>  |  46<H>  ----------------------------<  95  4.0   |  20<L>  |  1.73<H>    Ca    8.4<L>      02 Nov 2019 07:51  Mg     2.1     11-01    TPro  6.8  /  Alb  3.1<L>  /  TBili  0.6  /  DBili  x   /  AST  14<L>  /  ALT  16  /  AlkPhos  76  11-01 11-02 @ 07:51  Trop-I  0.067    11-01 @ 15:45  Trop-I  0.075    Pro BNP  9770 11-01 @ 15:45    PT/INR - ( 01 Nov 2019 15:45 )   PT: 12.8 sec;   INR: 1.15 ratio    PTT - ( 01 Nov 2019 15:45 )  PTT:31.4 sec    RADIOLOGY & ADDITIONAL STUDIES:    Xray Chest 1 View- PORTABLE-Urgent (11.01.19 @ 17:04) >  Impression: No active pulmonary disease. Cardiomegaly.

## 2019-11-02 NOTE — PROVIDER CONTACT NOTE (OTHER) - SITUATION
Dr. Alexandre s/colton Charlton from [facility] service
Consult called for Dr. Cruz- spoke with Scot at answering service.

## 2019-11-03 ENCOUNTER — TRANSCRIPTION ENCOUNTER (OUTPATIENT)
Age: 84
End: 2019-11-03

## 2019-11-03 ENCOUNTER — INPATIENT (INPATIENT)
Facility: HOSPITAL | Age: 84
LOS: 4 days | Discharge: NURSING, REHAB OR LONG-TERM CARE | DRG: 312 | End: 2019-11-08
Attending: FAMILY MEDICINE | Admitting: FAMILY MEDICINE
Payer: MEDICARE

## 2019-11-03 VITALS
RESPIRATION RATE: 18 BRPM | SYSTOLIC BLOOD PRESSURE: 132 MMHG | HEART RATE: 60 BPM | OXYGEN SATURATION: 96 % | TEMPERATURE: 98 F | DIASTOLIC BLOOD PRESSURE: 60 MMHG

## 2019-11-03 VITALS — TEMPERATURE: 97 F

## 2019-11-03 DIAGNOSIS — R55 SYNCOPE AND COLLAPSE: ICD-10-CM

## 2019-11-03 DIAGNOSIS — Z95.0 PRESENCE OF CARDIAC PACEMAKER: Chronic | ICD-10-CM

## 2019-11-03 DIAGNOSIS — Z95.1 PRESENCE OF AORTOCORONARY BYPASS GRAFT: Chronic | ICD-10-CM

## 2019-11-03 LAB
ADD ON TEST-SPECIMEN IN LAB: SIGNIFICANT CHANGE UP
ALBUMIN SERPL ELPH-MCNC: 3.2 G/DL — LOW (ref 3.3–5)
ALP SERPL-CCNC: 89 U/L — SIGNIFICANT CHANGE UP (ref 40–120)
ALT FLD-CCNC: 23 U/L — SIGNIFICANT CHANGE UP (ref 12–78)
ANION GAP SERPL CALC-SCNC: 10 MMOL/L — SIGNIFICANT CHANGE UP (ref 5–17)
ANION GAP SERPL CALC-SCNC: 7 MMOL/L — SIGNIFICANT CHANGE UP (ref 5–17)
APPEARANCE UR: ABNORMAL
APTT BLD: 29 SEC — SIGNIFICANT CHANGE UP (ref 27.5–36.3)
AST SERPL-CCNC: 26 U/L — SIGNIFICANT CHANGE UP (ref 15–37)
BASOPHILS # BLD AUTO: 0.05 K/UL — SIGNIFICANT CHANGE UP (ref 0–0.2)
BASOPHILS NFR BLD AUTO: 0.6 % — SIGNIFICANT CHANGE UP (ref 0–2)
BILIRUB SERPL-MCNC: 0.4 MG/DL — SIGNIFICANT CHANGE UP (ref 0.2–1.2)
BILIRUB UR-MCNC: NEGATIVE — SIGNIFICANT CHANGE UP
BUN SERPL-MCNC: 43 MG/DL — HIGH (ref 7–23)
BUN SERPL-MCNC: 44 MG/DL — HIGH (ref 7–23)
CALCIUM SERPL-MCNC: 8.3 MG/DL — LOW (ref 8.5–10.1)
CALCIUM SERPL-MCNC: 8.4 MG/DL — LOW (ref 8.5–10.1)
CHLORIDE SERPL-SCNC: 111 MMOL/L — HIGH (ref 96–108)
CHLORIDE SERPL-SCNC: 113 MMOL/L — HIGH (ref 96–108)
CK SERPL-CCNC: 87 U/L — SIGNIFICANT CHANGE UP (ref 26–308)
CO2 SERPL-SCNC: 22 MMOL/L — SIGNIFICANT CHANGE UP (ref 22–31)
CO2 SERPL-SCNC: 24 MMOL/L — SIGNIFICANT CHANGE UP (ref 22–31)
COLOR SPEC: YELLOW — SIGNIFICANT CHANGE UP
CREAT SERPL-MCNC: 1.49 MG/DL — HIGH (ref 0.5–1.3)
CREAT SERPL-MCNC: 1.78 MG/DL — HIGH (ref 0.5–1.3)
DIFF PNL FLD: NEGATIVE — SIGNIFICANT CHANGE UP
EOSINOPHIL # BLD AUTO: 0.26 K/UL — SIGNIFICANT CHANGE UP (ref 0–0.5)
EOSINOPHIL NFR BLD AUTO: 3.3 % — SIGNIFICANT CHANGE UP (ref 0–6)
GLUCOSE SERPL-MCNC: 120 MG/DL — HIGH (ref 70–99)
GLUCOSE SERPL-MCNC: 97 MG/DL — SIGNIFICANT CHANGE UP (ref 70–99)
GLUCOSE UR QL: NEGATIVE MG/DL — SIGNIFICANT CHANGE UP
HCT VFR BLD CALC: 27.7 % — LOW (ref 39–50)
HCT VFR BLD CALC: 29.2 % — LOW (ref 39–50)
HGB BLD-MCNC: 8.7 G/DL — LOW (ref 13–17)
HGB BLD-MCNC: 9.3 G/DL — LOW (ref 13–17)
IMM GRANULOCYTES NFR BLD AUTO: 0.4 % — SIGNIFICANT CHANGE UP (ref 0–1.5)
INR BLD: 1.14 RATIO — SIGNIFICANT CHANGE UP (ref 0.88–1.16)
KETONES UR-MCNC: ABNORMAL
LEUKOCYTE ESTERASE UR-ACNC: ABNORMAL
LYMPHOCYTES # BLD AUTO: 0.56 K/UL — LOW (ref 1–3.3)
LYMPHOCYTES # BLD AUTO: 7.1 % — LOW (ref 13–44)
MAGNESIUM SERPL-MCNC: 2.1 MG/DL — SIGNIFICANT CHANGE UP (ref 1.6–2.6)
MCHC RBC-ENTMCNC: 31.4 GM/DL — LOW (ref 32–36)
MCHC RBC-ENTMCNC: 31.4 PG — SIGNIFICANT CHANGE UP (ref 27–34)
MCHC RBC-ENTMCNC: 31.8 GM/DL — LOW (ref 32–36)
MCHC RBC-ENTMCNC: 32 PG — SIGNIFICANT CHANGE UP (ref 27–34)
MCV RBC AUTO: 100 FL — SIGNIFICANT CHANGE UP (ref 80–100)
MCV RBC AUTO: 100.3 FL — HIGH (ref 80–100)
MONOCYTES # BLD AUTO: 0.85 K/UL — SIGNIFICANT CHANGE UP (ref 0–0.9)
MONOCYTES NFR BLD AUTO: 10.8 % — SIGNIFICANT CHANGE UP (ref 2–14)
NEUTROPHILS # BLD AUTO: 6.14 K/UL — SIGNIFICANT CHANGE UP (ref 1.8–7.4)
NEUTROPHILS NFR BLD AUTO: 77.8 % — HIGH (ref 43–77)
NITRITE UR-MCNC: NEGATIVE — SIGNIFICANT CHANGE UP
NT-PROBNP SERPL-SCNC: HIGH PG/ML (ref 0–450)
PH UR: 5 — SIGNIFICANT CHANGE UP (ref 5–8)
PLATELET # BLD AUTO: 120 K/UL — LOW (ref 150–400)
PLATELET # BLD AUTO: 129 K/UL — LOW (ref 150–400)
POTASSIUM SERPL-MCNC: 3.9 MMOL/L — SIGNIFICANT CHANGE UP (ref 3.5–5.3)
POTASSIUM SERPL-MCNC: 4.2 MMOL/L — SIGNIFICANT CHANGE UP (ref 3.5–5.3)
POTASSIUM SERPL-SCNC: 3.9 MMOL/L — SIGNIFICANT CHANGE UP (ref 3.5–5.3)
POTASSIUM SERPL-SCNC: 4.2 MMOL/L — SIGNIFICANT CHANGE UP (ref 3.5–5.3)
PROT SERPL-MCNC: 7 GM/DL — SIGNIFICANT CHANGE UP (ref 6–8.3)
PROT UR-MCNC: 100 MG/DL
PROTHROM AB SERPL-ACNC: 12.7 SEC — SIGNIFICANT CHANGE UP (ref 10–12.9)
RBC # BLD: 2.77 M/UL — LOW (ref 4.2–5.8)
RBC # BLD: 2.91 M/UL — LOW (ref 4.2–5.8)
RBC # FLD: 15.2 % — HIGH (ref 10.3–14.5)
RBC # FLD: 15.4 % — HIGH (ref 10.3–14.5)
SODIUM SERPL-SCNC: 143 MMOL/L — SIGNIFICANT CHANGE UP (ref 135–145)
SODIUM SERPL-SCNC: 144 MMOL/L — SIGNIFICANT CHANGE UP (ref 135–145)
SP GR SPEC: 1.02 — SIGNIFICANT CHANGE UP (ref 1.01–1.02)
TROPONIN I SERPL-MCNC: 0.07 NG/ML — HIGH (ref 0.01–0.04)
TROPONIN I SERPL-MCNC: 0.07 NG/ML — HIGH (ref 0.01–0.04)
TROPONIN I SERPL-MCNC: 0.1 NG/ML — HIGH (ref 0.01–0.04)
UROBILINOGEN FLD QL: 1 MG/DL
WBC # BLD: 7.22 K/UL — SIGNIFICANT CHANGE UP (ref 3.8–10.5)
WBC # BLD: 7.89 K/UL — SIGNIFICANT CHANGE UP (ref 3.8–10.5)
WBC # FLD AUTO: 7.22 K/UL — SIGNIFICANT CHANGE UP (ref 3.8–10.5)
WBC # FLD AUTO: 7.89 K/UL — SIGNIFICANT CHANGE UP (ref 3.8–10.5)

## 2019-11-03 PROCEDURE — 82607 VITAMIN B-12: CPT

## 2019-11-03 PROCEDURE — 84484 ASSAY OF TROPONIN QUANT: CPT

## 2019-11-03 PROCEDURE — 97530 THERAPEUTIC ACTIVITIES: CPT | Mod: GP

## 2019-11-03 PROCEDURE — 82728 ASSAY OF FERRITIN: CPT

## 2019-11-03 PROCEDURE — 83735 ASSAY OF MAGNESIUM: CPT

## 2019-11-03 PROCEDURE — 82272 OCCULT BLD FECES 1-3 TESTS: CPT

## 2019-11-03 PROCEDURE — 72170 X-RAY EXAM OF PELVIS: CPT | Mod: 26

## 2019-11-03 PROCEDURE — 71045 X-RAY EXAM CHEST 1 VIEW: CPT | Mod: 26

## 2019-11-03 PROCEDURE — 97116 GAIT TRAINING THERAPY: CPT | Mod: GP

## 2019-11-03 PROCEDURE — 70450 CT HEAD/BRAIN W/O DYE: CPT | Mod: 26

## 2019-11-03 PROCEDURE — 93010 ELECTROCARDIOGRAM REPORT: CPT

## 2019-11-03 PROCEDURE — 84100 ASSAY OF PHOSPHORUS: CPT

## 2019-11-03 PROCEDURE — 82746 ASSAY OF FOLIC ACID SERUM: CPT

## 2019-11-03 PROCEDURE — 85045 AUTOMATED RETICULOCYTE COUNT: CPT

## 2019-11-03 PROCEDURE — 71045 X-RAY EXAM CHEST 1 VIEW: CPT

## 2019-11-03 PROCEDURE — 86880 COOMBS TEST DIRECT: CPT

## 2019-11-03 PROCEDURE — 81001 URINALYSIS AUTO W/SCOPE: CPT

## 2019-11-03 PROCEDURE — 97163 PT EVAL HIGH COMPLEX 45 MIN: CPT | Mod: GP

## 2019-11-03 PROCEDURE — 72125 CT NECK SPINE W/O DYE: CPT | Mod: 26

## 2019-11-03 PROCEDURE — 85025 COMPLETE CBC W/AUTO DIFF WBC: CPT

## 2019-11-03 PROCEDURE — 80053 COMPREHEN METABOLIC PANEL: CPT

## 2019-11-03 PROCEDURE — 95819 EEG AWAKE AND ASLEEP: CPT

## 2019-11-03 PROCEDURE — 85027 COMPLETE CBC AUTOMATED: CPT

## 2019-11-03 PROCEDURE — 93005 ELECTROCARDIOGRAM TRACING: CPT

## 2019-11-03 PROCEDURE — 36415 COLL VENOUS BLD VENIPUNCTURE: CPT

## 2019-11-03 PROCEDURE — 93306 TTE W/DOPPLER COMPLETE: CPT

## 2019-11-03 PROCEDURE — 83550 IRON BINDING TEST: CPT

## 2019-11-03 PROCEDURE — 80048 BASIC METABOLIC PNL TOTAL CA: CPT

## 2019-11-03 PROCEDURE — 87086 URINE CULTURE/COLONY COUNT: CPT

## 2019-11-03 PROCEDURE — 85610 PROTHROMBIN TIME: CPT

## 2019-11-03 PROCEDURE — 83540 ASSAY OF IRON: CPT

## 2019-11-03 RX ORDER — ATORVASTATIN CALCIUM 80 MG/1
40 TABLET, FILM COATED ORAL AT BEDTIME
Refills: 0 | Status: DISCONTINUED | OUTPATIENT
Start: 2019-11-03 | End: 2019-11-08

## 2019-11-03 RX ORDER — FERROUS SULFATE 325(65) MG
1 TABLET ORAL
Qty: 0 | Refills: 0 | DISCHARGE

## 2019-11-03 RX ORDER — LEVOTHYROXINE SODIUM 125 MCG
50 TABLET ORAL DAILY
Refills: 0 | Status: DISCONTINUED | OUTPATIENT
Start: 2019-11-03 | End: 2019-11-08

## 2019-11-03 RX ORDER — DONEPEZIL HYDROCHLORIDE 10 MG/1
5 TABLET, FILM COATED ORAL AT BEDTIME
Refills: 0 | Status: DISCONTINUED | OUTPATIENT
Start: 2019-11-03 | End: 2019-11-08

## 2019-11-03 RX ORDER — SODIUM CHLORIDE 9 MG/ML
1000 INJECTION INTRAMUSCULAR; INTRAVENOUS; SUBCUTANEOUS ONCE
Refills: 0 | Status: COMPLETED | OUTPATIENT
Start: 2019-11-03 | End: 2019-11-03

## 2019-11-03 RX ORDER — ASPIRIN/CALCIUM CARB/MAGNESIUM 324 MG
81 TABLET ORAL DAILY
Refills: 0 | Status: DISCONTINUED | OUTPATIENT
Start: 2019-11-03 | End: 2019-11-08

## 2019-11-03 RX ORDER — FUROSEMIDE 40 MG
1 TABLET ORAL
Qty: 30 | Refills: 0
Start: 2019-11-03 | End: 2019-12-02

## 2019-11-03 RX ORDER — FUROSEMIDE 40 MG
10 TABLET ORAL DAILY
Refills: 0 | Status: DISCONTINUED | OUTPATIENT
Start: 2019-11-03 | End: 2019-11-04

## 2019-11-03 RX ORDER — CARVEDILOL PHOSPHATE 80 MG/1
6.25 CAPSULE, EXTENDED RELEASE ORAL EVERY 12 HOURS
Refills: 0 | Status: DISCONTINUED | OUTPATIENT
Start: 2019-11-03 | End: 2019-11-08

## 2019-11-03 RX ORDER — TAMSULOSIN HYDROCHLORIDE 0.4 MG/1
0.4 CAPSULE ORAL AT BEDTIME
Refills: 0 | Status: DISCONTINUED | OUTPATIENT
Start: 2019-11-03 | End: 2019-11-08

## 2019-11-03 RX ORDER — FERROUS SULFATE 325(65) MG
325 TABLET ORAL DAILY
Refills: 0 | Status: DISCONTINUED | OUTPATIENT
Start: 2019-11-03 | End: 2019-11-08

## 2019-11-03 RX ORDER — HEPARIN SODIUM 5000 [USP'U]/ML
5000 INJECTION INTRAVENOUS; SUBCUTANEOUS
Refills: 0 | Status: DISCONTINUED | OUTPATIENT
Start: 2019-11-03 | End: 2019-11-08

## 2019-11-03 RX ORDER — MEMANTINE HYDROCHLORIDE 10 MG/1
10 TABLET ORAL
Refills: 0 | Status: DISCONTINUED | OUTPATIENT
Start: 2019-11-03 | End: 2019-11-08

## 2019-11-03 RX ORDER — CHOLECALCIFEROL (VITAMIN D3) 125 MCG
1000 CAPSULE ORAL DAILY
Refills: 0 | Status: DISCONTINUED | OUTPATIENT
Start: 2019-11-03 | End: 2019-11-08

## 2019-11-03 RX ADMIN — HEPARIN SODIUM 5000 UNIT(S): 5000 INJECTION INTRAVENOUS; SUBCUTANEOUS at 05:32

## 2019-11-03 RX ADMIN — Medication 81 MILLIGRAM(S): at 10:00

## 2019-11-03 RX ADMIN — ATORVASTATIN CALCIUM 40 MILLIGRAM(S): 80 TABLET, FILM COATED ORAL at 21:15

## 2019-11-03 RX ADMIN — MEMANTINE HYDROCHLORIDE 10 MILLIGRAM(S): 10 TABLET ORAL at 05:33

## 2019-11-03 RX ADMIN — DONEPEZIL HYDROCHLORIDE 5 MILLIGRAM(S): 10 TABLET, FILM COATED ORAL at 21:44

## 2019-11-03 RX ADMIN — SODIUM CHLORIDE 1000 MILLILITER(S): 9 INJECTION INTRAMUSCULAR; INTRAVENOUS; SUBCUTANEOUS at 16:25

## 2019-11-03 RX ADMIN — CARVEDILOL PHOSPHATE 6.25 MILLIGRAM(S): 80 CAPSULE, EXTENDED RELEASE ORAL at 05:32

## 2019-11-03 RX ADMIN — TAMSULOSIN HYDROCHLORIDE 0.4 MILLIGRAM(S): 0.4 CAPSULE ORAL at 21:15

## 2019-11-03 RX ADMIN — SODIUM CHLORIDE 1000 MILLILITER(S): 9 INJECTION INTRAMUSCULAR; INTRAVENOUS; SUBCUTANEOUS at 17:25

## 2019-11-03 RX ADMIN — Medication 325 MILLIGRAM(S): at 10:00

## 2019-11-03 RX ADMIN — Medication 50 MICROGRAM(S): at 05:33

## 2019-11-03 NOTE — DISCHARGE NOTE PROVIDER - CARE PROVIDER_API CALL
Brendan Colmenares (MD)  Cardiovascular Disease; Internal Medicine  [address] [place] [address]  Phone: [phone]  Fax: [phone]  Follow Up Time: 7-10 Days    Shamar Sandoval)  Internal Medicine  [address] [place] [address]  Phone: [phone]  Fax: [phone]  Follow Up Time: 7-10 Days

## 2019-11-03 NOTE — ED ADULT NURSE NOTE - OBJECTIVE STATEMENT
pt BIBEMS s/p syncopal episode while on toilet. pt recently assessed at [facility] for same, 2x past week w/ no findings. pt denies all complaints. states syncope occurred s/p straining for BM.

## 2019-11-03 NOTE — DISCHARGE NOTE NURSING/CASE MANAGEMENT/SOCIAL WORK - PATIENT PORTAL LINK FT
You can access the FollowMyHealth Patient Portal offered by [facility] by registering at the following website: [email or web address]. By joining [employer]’s FollowMyHealth portal, you will also be able to view your health information using other applications (apps) compatible with our system.

## 2019-11-03 NOTE — DISCHARGE NOTE PROVIDER - NSDCMRMEDTOKEN_GEN_ALL_CORE_FT
aspirin 81 mg oral delayed release tablet: 1 tab(s) orally once a day  atorvastatin 40 mg oral tablet: 1 tab(s) orally once a day (at bedtime)  carvedilol 12.5 mg oral tablet: 1 tab(s) orally 2 times a day  donepezil 5 mg oral tablet: 1 tab(s) orally once a day (at bedtime)  ferrous sulfate 325 mg (65 mg elemental iron) oral tablet: 1 tab(s) orally once a day  Flomax 0.4 mg oral capsule: 1 cap(s) orally once a day (at bedtime)  Lasix 20 mg oral tablet: 1 tab(s) orally once a day   levothyroxine 50 mcg (0.05 mg) oral tablet: 1 tab(s) orally once a day  memantine 10 mg oral tablet: 1 tab(s) orally 2 times a day  Vitamin D3 1000 intl units oral tablet: 1 tab(s) orally once a day

## 2019-11-03 NOTE — PROGRESS NOTE ADULT - REASON FOR ADMISSION
Near syncope, hypotension  PPM Hx  Increased confusion  Troponin elevation, CAD  Dehydration  Fall 2 days PTA
Near syncope, hypotension  PPM Hx  Increased confusion  Troponin elevation, CAD  Dehydration  Fall 2 days PTA

## 2019-11-03 NOTE — PATIENT PROFILE ADULT - LOCATION #1
Finger or Toe Contusion (Child)  A contusion is another word for a bruise. It happens when small blood vessels break open and leak blood into the nearby area. A finger or toe contusion can result from a bump, hit, or fall. Symptoms of a contusion often include changes in skin color (bruising), swelling, and pain. It may take several hours for a deep bruise to show up. If the injury is severe, your child may need an X-ray to check for broken bones.  The finger or toe may be taped or wrapped to protect it and help reduce swelling. For a severely bruised toe, the child may need crutches to get around for a few days.  Swelling should decrease in a few days. Bruising and pain may take several weeks to go away. Your child can gradually go back to normal activities when the swelling has gone down and he or she feels better.   Home care  Follow these guidelines when caring for your child at home:  · Your child’s health care provider may prescribe medicines for pain and inflammation. Follow all instructions for giving these to your child.  · Have your child rest the leg. You may need to restrict your child's activities for a few days.  · When your child sits or lies down, have your child elevate the affected hand or foot above the level of his or her heart as often as possible. This is to help ease swelling. For a child older than one year, prop the child's hand or foot on pillows.  · Use cold to help reduce swelling and pain. For infants or toddlers, wet a clean cloth with cold water, then wring it out. For older children, use a cold pack or a plastic bag of ice cubes wrapped in a thin, dry cloth  Apply the cold source to the bruised area for up to 20 minutes. Repeat this several times a day while your child is awake. Continue for 1 or 2 days or as instructed.  · When the swelling has gone away, start using warm compresses. This is a clean cloth that’s damp with warm water. Apply this to the area for 10 minutes, several  times a day.  · If your child was given tape or a wrap, follow instructions for how to use it and when to remove it.  · Follow any other instructions you were given.  · Keep in mind that bruising may take several weeks to go away.  Follow-up care  Follow up with your child’s health care provider.  Special note to parents  Health care providers are trained to see injuries such as this in young children as a sign of possible abuse. You may be asked questions about how your child was injured. Health care providers are required by law to ask you these questions. This is done to protect your child. Please try to be patient.  When to seek medical advice  Call your child's health care provider right away if your child has any of these:  · Bruising that gets worse  · Pain or swelling that doesn't get better or that gets worse  · Numbness or tingling of the affected foot or hand  · The affected finger or hand or affected toe or foot feels cold or looks very pale  © 4314-7822 [employer]. [address] [place] [address], [place] [address]. All rights reserved. This information is not intended as a substitute for professional medical care. Always follow your healthcare professional's instructions.         coccyx

## 2019-11-03 NOTE — ED PROVIDER NOTE - NS_ ATTENDINGSCRIBEDETAILS _ED_A_ED_FT
I, Landry Hart MD,  performed the initial face to face bedside interview with this patient regarding history of present illness, review of symptoms and relevant past medical, social and family history.  I completed an independent physical examination.  I was the initial provider who evaluated this patient.  The history, relevant review of systems, past medical and surgical history, medical decision making, and physical examination was documented by the scribe in my presence and I attest to the accuracy of the documentation.

## 2019-11-03 NOTE — PHARMACOTHERAPY INTERVENTION NOTE - COMMENTS
Med history completed, verified with patient's son at bedside with a discharge med list from recent discharge and [facility].

## 2019-11-03 NOTE — ED PROVIDER NOTE - OBJECTIVE STATEMENT
89 y/o male with PMHx of CHF, pacemaker present to the ED c/o neck pain, s/p multiple syncopal episodes. Per son, pt was calling for family from floor of bathroom after syncopizing while having BM. Son brought pt to bed, after which he syncopized again. Dc'd from ED this AM. Similar history given during last ED visits. Pacemaker interrogated last night. Denies CP, SOB, abd pain, dysuria, back pain. 89 y/o male with PMHx of CHF, pacemaker present to the ED s/p multiple syncopal episodes. Per son, pt was calling for family from floor of bathroom after syncopizing while having BM. Son brought pt to bed, after which he syncopized again. Dc'd from ED this AM. Similar history given during last ED visits had troponins cardiology and was d/c this am. Pacemaker interrogated last night. Denies CP, SOB, abd pain, dysuria, back pain or msk pain.

## 2019-11-03 NOTE — ED ADULT NURSE NOTE - HOW OFTEN DO YOU HAVE A DRINK CONTAINING ALCOHOL?
----- Message from Sixto Mary sent at 5/21/2018 10:57 AM CDT -----  Contact: Floridalma Romero            Name of Who is Calling: Floridalma Romero      What is the request in detail: Patient calling in regards to June appt with Dr. Wells      Can the clinic reply by [facility]: No      What Number to Call Back if not in [facility]: [phone] or [phone]                                   Never

## 2019-11-03 NOTE — H&P ADULT - HISTORY OF PRESENT ILLNESS
89 year old with pertient history of CAD s/p CABG now with pacemaker, CHF and recent admission for syncope  presented to the ED 3 hours after discharge for loss of conscious while having a bowel movement. Son endorses that patient called for help from the bathroom floor. Patient does not remember episode but remembers feeling hot before episode and having some posterior neck pain. Son takes patient was incoherent/unresponsive for roughly 5 minutes with fixed and dilated pupils. Patient denies any current chest pain, palpitations, headache, dizziness, unsteady gait, bowel or bladder incontinence.    In the ED patient was noted to have a troponin of 0.073, [place] [address], CT head and neck negative  for acute intracranial process.

## 2019-11-03 NOTE — ED PROVIDER NOTE - NS ED ROS FT
Constitutional: No fever or chills  Eyes: No visual changes  HEENT: No throat pain  CV: No chest pain. +syncope.   Resp: No SOB no cough  GI: No abd pain, nausea or vomiting  : No dysuria  MSK: +neck pain.  Skin: No rash  Neuro: No headache

## 2019-11-03 NOTE — ED PROVIDER NOTE - CLINICAL SUMMARY MEDICAL DECISION MAKING FREE TEXT BOX
89 M with CHF presents to ED s/p syncopal episode. Pt having BM in bathroom when passed out. son came over to help him up, brought to bed, passed out again. now asymptomatic. concern for syncope. likely vasovagal, will interrogate pacemaker, obtain cardiac enzymes, and reassess. 89 M with CHF presents to ED s/p syncopal episode. Pt having BM in bathroom when passed out. son came over to help him up, brought to bed, passed out again. now asymptomatic. concern for syncope. likely vasovagal however was recently found have low h/h and elevated trop, will interrogate pacemaker, obtain cardiac enzymes, and reassess.

## 2019-11-03 NOTE — DISCHARGE NOTE PROVIDER - NSDCCPCAREPLAN_GEN_ALL_CORE_FT
PRINCIPAL DISCHARGE DIAGNOSIS  Diagnosis: Near syncope  Assessment and Plan of Treatment: -Possibly due to hypotension  -Decreased dose of lasix from 40mg daily to 20mg daily  -Continue medications as prescribed  -Follow up with Cardiology and PCP within 2 weeks of discharge      SECONDARY DISCHARGE DIAGNOSES  Diagnosis: Anemia  Assessment and Plan of Treatment: -FOBT negative  -Likely due to chronic kidney disease vs iron deficiency  -Continue iron supplementation daily  -Follow up with PCP within 2 weeks of discharge    Diagnosis: Elevated troponin  Assessment and Plan of Treatment: -Likely due to demand ischemia  -Follow up with Cardiology within 2 weeks for further workup PRINCIPAL DISCHARGE DIAGNOSIS  Diagnosis: Near syncope  Assessment and Plan of Treatment: -Possibly due to hypotension  -Decreased dose of lasix from 40mg daily to 20mg daily  -Continue medications as prescribed  -Follow up with Cardiology and PCP within 2 weeks of discharge      SECONDARY DISCHARGE DIAGNOSES  Diagnosis: Anemia  Assessment and Plan of Treatment: -FOBT negative  -Likely due to chronic kidney disease vs iron deficiency  -Continue iron supplementation daily  -Follow up with PCP within 2 weeks of discharge    Diagnosis: Elevated troponin  Assessment and Plan of Treatment: -Likely due to demand ischemia  -Follow up with Cardiology within 2 weeks for further workup    Diagnosis: Fall in elderly patient  Assessment and Plan of Treatment: -Fall 2 days prior to recent ED visit. Sutures to be removed in 7 days- follow up with PCP

## 2019-11-03 NOTE — DISCHARGE NOTE PROVIDER - PROVIDER TOKENS
PROVIDER:[TOKEN:[[id number]],FOLLOWUP:[7-10 Days]],PROVIDER:[TOKEN:[[id number]],FOLLOWUP:[7-10 Days]]

## 2019-11-03 NOTE — ED PROVIDER NOTE - PROGRESS NOTE DETAILS
Kari TORREZ for ED Attending Dr. Hart: paged EP to interrogate pacemaker. Spoke with Dr. Gould of EP - states he interrogated pacemaker last night - normal battery life and no events - states that story sounds the same - will send someone to interrogate but doubts this is from an arrhytmia. Landry Hart M.D., Attending Physician elevated trop - pt endorsed to Dr. Myers for admission - [id number]. Landry Hart M.D., Attending Physician

## 2019-11-03 NOTE — H&P ADULT - ASSESSMENT
89 year old male patient with recent admission for syncope now here for re-admission for syncope    -Admit for Tele    #Syncope  DDX: Vaso-vagal, orthostatics, mechanical, neurogenic(seizure?), medication induced  -monitor on tele  -get morning echo  -check orthostatic vitals  -will half home lasix and coreg dose  -cardiology evaluation in the morning    #Concern for seizure  -pt with period of incoherence/ loc x 5 mins with dilated pupils  - get stat procalcitonin  -get EEG  -neuro consult in am    #Elevated Troponin  -elevated during last admission  -trend troponin  -no EKG changes noted  -cardiology to evaluate in the morning    #Acute on chronic kidney disease  -likely pre-renal  -s/p 1000 cc IVF in ED  - trend kidney function    #CHF  -Last EF 55 %  -currently euvolemic  -will half lasix and coreg  -get morning echo  -cardiology to evaluate in am    #Dementia  -pt on namenda and aricept    #Hypothyroidism  -on synthroid    #BPH   -on tamsulosin    Advanced directives  -full code    #Disposition  -likely discharge to rehab after above workup in light of recent readmisison 89 year old male patient with recent admission for syncope now here for re-admission for syncope    -Admit for Tele    #Syncope  DDX: Vaso-vagal, orthostatics, mechanical, neurogenic(seizure?), medication induced  -monitor on tele  -get morning echo  -check orthostatic vitals  -will half home lasix and coreg dose  -cardiology evaluation in the morning    #Concern for seizure  -pt with period of incoherence/ loc x 5 mins with dilated pupils  - get stat procalcitonin  -get EEG  -neuro consult in am    #Elevated Troponin  -elevated during last admission  -trend troponin  -no EKG changes noted  -cardiology to evaluate in the morning    #Acute on chronic kidney disease  -likely pre-renal  -s/p 1000 cc IVF in ED  - trend kidney function    #CAD  -on asa, statin and BB  #CHF  -Last EF 55 %  -currently euvolemic  -will half lasix and coreg  -get morning echo  -cardiology to evaluate in am    #Dementia  -pt on namenda and aricept    #Hypothyroidism  -on synthroid    #BPH   -on tamsulosin    Advanced directives  -full code    #Disposition  -likely discharge to rehab after above workup in light of recent readmisison

## 2019-11-03 NOTE — PROGRESS NOTE ADULT - ASSESSMENT
Syncope recently with scalp trauma - r/o hypovolemia (anemia, dehydration) vs arrhythmias  Hypotension - probably sec to hypovolemia, anemia  Currently near syncope, dizziness  Anemia - new  Acute on Chr / CKD  CAD, s/p CABG  AF, SSS, s/p PPM.   HTN by history    Suggest: Syncope recently with scalp trauma - r/o hypovolemia (anemia, dehydration) vs arrhythmias  Hypotension - probably sec to hypovolemia, anemia  Currently near syncope, dizziness  Anemia - new, further drop noted after hydration. No acute bleed noted.  Acute on CKD. Improving  CAD, s/p CABG  AF, SSS, s/p PPM. Not on anticoagulation  HTN by history    Suggest:    Increase Coreg to better control BP. BP increased after hydration.  Continue to hold Lasix for another day. May consider half dose Lasix at discharge  Hydration encouraged  Anemia work up per primary care. Drop in hemoglobin noted since January 2019 (was around 11 then)   for discharge planning.

## 2019-11-03 NOTE — PROGRESS NOTE ADULT - SUBJECTIVE AND OBJECTIVE BOX
89-year-old male admitted with complaints of dizziness, low blood pressure. He was noted to be hypotensive and confused at home. Systolic blood pressure was 80 mmHg the patient did not recognize his family members at home. Recent fall probably because of syncope. Patient has an occipital laceration but does not remember how he got it. Poor oral intake since his fall. Patient himself denies any complaints of chest pain, palpitations, shortness of breath. He feels fine at this time. His noncompliance to medications. Minimal elevation of troponin noted during this hospitalization.      PAST MEDICAL & SURGICAL HISTORY:    •	Atrial fibrillation	 	  •	Broken ribs	 	   	Fell and broken ribs, hospital for 4 days.	  •	CAD (coronary artery disease)	2003	   	S/P CABG. 	  •	Carotid arterial disease	 	   	B/L ICA 1-39%; Last Carotid 9/2014	  •	Chronic diastolic CHF (congestive heart failure)	 	  •	Current non-smoker but past smoking history unknown	 	  •	Former smoker	 	  •	Hyperlipidemia	 	  •	Hypertension	 	   	Last Stress Test 5/2008	  •	LV dysfunction	 	   	Last Echo 8/17/2015	  •	Mitral annular calcification	 	  •	MR (mitral regurgitation)	 	   	mild to moderate	  •	Nonischemic cardiomyopathy	 	  •	S/P AVR (aortic valve replacement)	2003	   	Bioprosthetic	  •	S/P placement of cardiac pacemaker	April 29 2014	   	[employer]	  •	SSS (sick sinus syndrome)	 	  •	TR (tricuspid regurgitation)	 	   	Mild	      PREVIOUS CARDIAC WORKUP:      Echo:  10/18  --Technically difficult study.  --There is severe left atrial dilatation (LA volume index 59 ml/m²).  --There is moderate left ventricular hypertrophy.  --Global LV wall motion is mildly hypokinetic.  --LV ejection fraction is borderline normal.  --There is right atrial dilatation.  --There is a pacemaker in the right heart.  --The aortic root is normal in size (3.7 cm). 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  --There is a bioprosthesis in the aortic valve position. The function of the aortic valve prosthesis is normal. There is mild aortic regurgitation.  --There is mitral annular calcification. There is mild mitral valve thickening. There is mild to moderate mitral regurgitation.  --There is moderate tricuspid regurgitation.  --There is mild pulmonic regurgitation.  --The right atrial pressure is 6 - 10 mm Hg. There is moderate pulmonary hypertension.  --There is no pericardial effusion.    Allergies:   No Known Allergies      MEDICATIONS  (STANDING):  aspirin enteric coated 81 milliGRAM(s) Oral daily  atorvastatin 40 milliGRAM(s) Oral at bedtime  carvedilol 6.25 milliGRAM(s) Oral every 12 hours  donepezil 5 milliGRAM(s) Oral at bedtime  ferrous    sulfate 325 milliGRAM(s) Oral daily  heparin  Injectable 5000 Unit(s) SubCutaneous every 12 hours  levothyroxine 50 MICROGram(s) Oral daily  memantine 10 milliGRAM(s) Oral two times a day  tamsulosin 0.4 milliGRAM(s) Oral at bedtime    MEDICATIONS  (PRN):      ROS:     Detailed ten system ROS was performed and was negative except for history as eluded to above.    no fever  no chills  no nausea  no vomiting  no diarrhea  no constipation  no melena  no hematochezia  no chest pain  no palpitations  no sob at rest  no dyspnea on exertion  no cough  no wheezing  no anorexia  no headache  + dizziness  + syncope  + weakness  no myalgia  no dysuria  no polyuria  no hematuria       Vital Signs Last 24 Hrs  T(C): 36.7 (03 Nov 2019 06:05), Max: 36.7 (03 Nov 2019 06:05)  T(F): 98.1 (03 Nov 2019 06:05), Max: 98.1 (03 Nov 2019 06:05)  HR: 60 (03 Nov 2019 06:05) (60 - 60)  BP: 152/65 (03 Nov 2019 06:05) (128/56 - 152/65)  RR: 18 (03 Nov 2019 06:05) (17 - 18)  SpO2: 95% (03 Nov 2019 06:05) (92% - 96%)    I&O's Summary      PHYSICAL EXAM:    General:                Comfortable, AAO X 3, in no distress.   HEENT:                  R occipital laceration, PERRLA, EOMI, conjunctiva clear. Pallor  Neck:                     Supple, no adenopathy, no thyromegaly, no JVD, no bruit.  Back:                     Symmetric, non tender.  Chest:                    Clear, B/L symmetric air entry, no tachypnea  Heart:                     S1, S2 normal, no gallop, + murmur.  Abdomen:              Soft, non-tender, bowel sounds active. No palpable masses.  Extremities:           no cyanosis, no edema. Peripheral pulses normal.  Skin:                      Skin color, texture normal. No rashes.  Neurologic:            Grossly nonfocal.       TELEMETRY:  V paced - no arrhythmias    ECG: V pcaed    LABS:                        8.7    7.22  )-----------( 120      ( 03 Nov 2019 07:38 )             27.7     11-03    144  |  113<H>  |  44<H>  ----------------------------<  97  3.9   |  24  |  1.49<H>    Ca    8.4<L>      03 Nov 2019 07:38  Mg     2.1     11-01    TPro  6.8  /  Alb  3.1<L>  /  TBili  0.6  /  DBili  x   /  AST  14<L>  /  ALT  16  /  AlkPhos  76  11-01 11-02 @ 07:51  Trop-I 0.067    Pro BNP  9770 11-01 @ 15:45      PT/INR - ( 01 Nov 2019 15:45 )   PT: 12.8 sec;   INR: 1.15 ratio    PTT - ( 01 Nov 2019 15:45 )  PTT:31.4 sec      RADIOLOGY & ADDITIONAL STUDIES:    Xray Chest 1 View- PORTABLE-Urgent (11.01.19 @ 17:04) >  Impression: No active pulmonary disease. Cardiomegaly. 89-year-old male admitted with complaints of dizziness, low blood pressure. He was noted to be hypotensive and confused at home. Systolic blood pressure was 80 mmHg the patient did not recognize his family members at home. Recent fall probably because of syncope. Patient has an occipital laceration but does not remember how he got it. Poor oral intake since his fall. Patient himself denies any complaints of chest pain, palpitations, shortness of breath. He feels fine at this time. His noncompliance to medications. Minimal elevation of troponin noted during this hospitalization.    Today, he feels fine. No dizziness No palpitations. No new events. No arrhthymias noted on tele or PPM check      PAST MEDICAL & SURGICAL HISTORY:    •	Atrial fibrillation	 	  •	Broken ribs	 	   	Fell and broken ribs, hospital for 4 days.	  •	CAD (coronary artery disease)	2003	   	S/P CABG. 	  •	Carotid arterial disease	 	   	B/L ICA 1-39%; Last Carotid 9/2014	  •	Chronic diastolic CHF (congestive heart failure)	 	  •	Current non-smoker but past smoking history unknown	 	  •	Former smoker	 	  •	Hyperlipidemia	 	  •	Hypertension	 	   	Last Stress Test 5/2008	  •	LV dysfunction	 	   	Last Echo 8/17/2015	  •	Mitral annular calcification	 	  •	MR (mitral regurgitation)	 	   	mild to moderate	  •	Nonischemic cardiomyopathy	 	  •	S/P AVR (aortic valve replacement)	2003	   	[employer]	  •	S/P placement of cardiac pacemaker	April 29 2014	   	[employer]	  •	SSS (sick sinus syndrome)	 	  •	TR (tricuspid regurgitation)	 	   	Mild	      PREVIOUS CARDIAC WORKUP:      Echo:  10/18  --Technically difficult study.  --There is severe left atrial dilatation (LA volume index 59 ml/m²).  --There is moderate left ventricular hypertrophy.  --Global LV wall motion is mildly hypokinetic.  --LV ejection fraction is borderline normal.  --There is right atrial dilatation.  --There is a pacemaker in the right heart.  --The aortic root is normal in size (3.7 cm). 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  --There is a bioprosthesis in the aortic valve position. The function of the aortic valve prosthesis is normal. There is mild aortic regurgitation.  --There is mitral annular calcification. There is mild mitral valve thickening. There is mild to moderate mitral regurgitation.  --There is moderate tricuspid regurgitation.  --There is mild pulmonic regurgitation.  --The right atrial pressure is 6 - 10 mm Hg. There is moderate pulmonary hypertension.  --There is no pericardial effusion.    Allergies:   No Known Allergies      MEDICATIONS  (STANDING):  aspirin enteric coated 81 milliGRAM(s) Oral daily  atorvastatin 40 milliGRAM(s) Oral at bedtime  carvedilol 6.25 milliGRAM(s) Oral every 12 hours  donepezil 5 milliGRAM(s) Oral at bedtime  ferrous    sulfate 325 milliGRAM(s) Oral daily  heparin  Injectable 5000 Unit(s) SubCutaneous every 12 hours  levothyroxine 50 MICROGram(s) Oral daily  memantine 10 milliGRAM(s) Oral two times a day  tamsulosin 0.4 milliGRAM(s) Oral at bedtime    MEDICATIONS  (PRN):      ROS:     Detailed ten system ROS was performed and was negative except for history as eluded to above.    no fever  no chills  no nausea  no vomiting  no diarrhea  no constipation  no melena  no hematochezia  no chest pain  no palpitations  no sob at rest  no dyspnea on exertion  no cough  no wheezing  no anorexia  no headache  + dizziness  + syncope  + weakness  no myalgia  no dysuria  no polyuria  no hematuria       Vital Signs Last 24 Hrs  T(C): 36.7 (03 Nov 2019 06:05), Max: 36.7 (03 Nov 2019 06:05)  T(F): 98.1 (03 Nov 2019 06:05), Max: 98.1 (03 Nov 2019 06:05)  HR: 60 (03 Nov 2019 06:05) (60 - 60)  BP: 152/65 (03 Nov 2019 06:05) (128/56 - 152/65)  RR: 18 (03 Nov 2019 06:05) (17 - 18)  SpO2: 95% (03 Nov 2019 06:05) (92% - 96%)    I&O's Summary      PHYSICAL EXAM:    General:                Comfortable, AAO X 3, in no distress.   HEENT:                  R occipital laceration, PERRLA, EOMI, conjunctiva clear. Pallor  Neck:                     Supple, no adenopathy, no thyromegaly, no JVD, no bruit.  Back:                     Symmetric, non tender.  Chest:                    Clear, B/L symmetric air entry, no tachypnea  Heart:                     S1, S2 normal, no gallop, + murmur.  Abdomen:              Soft, non-tender, bowel sounds active. No palpable masses.  Extremities:           no cyanosis, no edema. Peripheral pulses normal.  Skin:                      Skin color, texture normal. No rashes.  Neurologic:            Grossly nonfocal.       TELEMETRY:  V paced - no arrhythmias    ECG: V paced    LABS:                        8.7    7.22  )-----------( 120      ( 03 Nov 2019 07:38 )             27.7     11-03    144  |  113<H>  |  44<H>  ----------------------------<  97  3.9   |  24  |  1.49<H>    Ca    8.4<L>      03 Nov 2019 07:38  Mg     2.1     11-01    TPro  6.8  /  Alb  3.1<L>  /  TBili  0.6  /  DBili  x   /  AST  14<L>  /  ALT  16  /  AlkPhos  76  11-01 11-02 @ 07:51  Trop-I 0.067    Pro BNP  9770 11-01 @ 15:45      PT/INR - ( 01 Nov 2019 15:45 )   PT: 12.8 sec;   INR: 1.15 ratio    PTT - ( 01 Nov 2019 15:45 )  PTT:31.4 sec      RADIOLOGY & ADDITIONAL STUDIES:    Xray Chest 1 View- PORTABLE-Urgent (11.01.19 @ 17:04) >  Impression: No active pulmonary disease. Cardiomegaly.

## 2019-11-03 NOTE — DISCHARGE NOTE PROVIDER - HOSPITAL COURSE
Pt is a 89 y/o male with a PMHx of artificial pacemaker, CAD s/p CABG, Diastolic  CHF, Alzheimer's disease who presents to the ED via EMS after an episode of hypotension and confusion at 2:30pm this afternoon. Pt 's systolic blood pressure at home was 80 mmHg and pt did not recognize his family at home. EMS gave support with O2 and pt responded well.    Previously pt had a mechanical  fall backwards  2 days ago and came to [facility], where he was evaluated and required a few sutures on his scalp.  Pt's son reports he has been sleeping a little more than usual and has been taking less po intake since his recent fall.  Pt himself denies chest pain/palpitations, no  SOB/abd pain.    No resp or urinary complaints. No fever/chills.         11/2/19- Patient seen and examined at bedside. States he feels well. Slightly confused, as per son at bedside, patient's short term memory is poor. Patient states he feels well, denies any more dizziness/lightheadedness. BP stable while in hospital. Discussed with Cardio, patient should have PPM checked. Also stated patient with downward trend in Hgb on outpatient labs and inpatient- however patient declines noticing any blood in stool- FOBT negative    11/3/19- Patient seen and examined at bedside. States he feels well, eager to go home. PPM interrogated yesterday- no events noted. Patient with anemia- unsure chronic vs. new onset. FOBT negative.         Physical Exam:    General: WN/WD NAD    Neurology: A&Ox3, nonfocal, RENDON x 4    Respiratory: CTA B/L    CV: RRR, S1S2    Abdominal: Soft, NT, ND +BS    Extremities: No edema, + peripheral pulses        Near syncope, hypotension, DDX arrythmia, orthostatic hypotension, polypharmacy    PPM Hx    Increased confusion    Troponin elevation, CAD    Dehydration, ARF    Mild Increase in Chronic anemia    Fall 2 days PTA        - s/p 500ml Normal Saline in the ED    - hold lasix today.  Pt's son Jonn reports pt may be taking lasix for CHF hx.  He will review meds at home with pts wife to check for incorrect administration or duplicate administration of bp meds.     - repeat labs    - cardiology consult, PPM eval- no events noted    - hypotension, resolved, now hypertensive again, initially reduced coreg, spoke with Cardio, can resume home dose and lower lasix upon d/c    - Advanced directives, discussed with pt's son, HCP, Jonn at bedside.  Pt's son will complete Molst after further discussion with his family.     - DVT prophylaxis : heparin    - FOBT negative    - Continue ferrous sulfate, follow up with PCP regarding further workup for anemia.

## 2019-11-04 LAB
ALBUMIN SERPL ELPH-MCNC: 2.9 G/DL — LOW (ref 3.3–5)
ALP SERPL-CCNC: 75 U/L — SIGNIFICANT CHANGE UP (ref 40–120)
ALT FLD-CCNC: 18 U/L — SIGNIFICANT CHANGE UP (ref 12–78)
ANION GAP SERPL CALC-SCNC: 4 MMOL/L — LOW (ref 5–17)
AST SERPL-CCNC: 14 U/L — LOW (ref 15–37)
BILIRUB SERPL-MCNC: 0.6 MG/DL — SIGNIFICANT CHANGE UP (ref 0.2–1.2)
BUN SERPL-MCNC: 38 MG/DL — HIGH (ref 7–23)
CALCIUM SERPL-MCNC: 8.4 MG/DL — LOW (ref 8.5–10.1)
CHLORIDE SERPL-SCNC: 111 MMOL/L — HIGH (ref 96–108)
CO2 SERPL-SCNC: 25 MMOL/L — SIGNIFICANT CHANGE UP (ref 22–31)
CREAT SERPL-MCNC: 1.66 MG/DL — HIGH (ref 0.5–1.3)
GLUCOSE SERPL-MCNC: 102 MG/DL — HIGH (ref 70–99)
HCT VFR BLD CALC: 25.9 % — LOW (ref 39–50)
HGB BLD-MCNC: 8.5 G/DL — LOW (ref 13–17)
INR BLD: 1.22 RATIO — HIGH (ref 0.88–1.16)
MAGNESIUM SERPL-MCNC: 2.1 MG/DL — SIGNIFICANT CHANGE UP (ref 1.6–2.6)
MCHC RBC-ENTMCNC: 32.2 PG — SIGNIFICANT CHANGE UP (ref 27–34)
MCHC RBC-ENTMCNC: 32.8 GM/DL — SIGNIFICANT CHANGE UP (ref 32–36)
MCV RBC AUTO: 98.1 FL — SIGNIFICANT CHANGE UP (ref 80–100)
PHOSPHATE SERPL-MCNC: 2.7 MG/DL — SIGNIFICANT CHANGE UP (ref 2.5–4.5)
PLATELET # BLD AUTO: 117 K/UL — LOW (ref 150–400)
POTASSIUM SERPL-MCNC: 3.8 MMOL/L — SIGNIFICANT CHANGE UP (ref 3.5–5.3)
POTASSIUM SERPL-SCNC: 3.8 MMOL/L — SIGNIFICANT CHANGE UP (ref 3.5–5.3)
PROT SERPL-MCNC: 6.3 GM/DL — SIGNIFICANT CHANGE UP (ref 6–8.3)
PROTHROM AB SERPL-ACNC: 13.6 SEC — HIGH (ref 10–12.9)
RBC # BLD: 2.64 M/UL — LOW (ref 4.2–5.8)
RBC # FLD: 15.5 % — HIGH (ref 10.3–14.5)
SODIUM SERPL-SCNC: 140 MMOL/L — SIGNIFICANT CHANGE UP (ref 135–145)
WBC # BLD: 6.51 K/UL — SIGNIFICANT CHANGE UP (ref 3.8–10.5)
WBC # FLD AUTO: 6.51 K/UL — SIGNIFICANT CHANGE UP (ref 3.8–10.5)

## 2019-11-04 PROCEDURE — 95819 EEG AWAKE AND ASLEEP: CPT | Mod: 26

## 2019-11-04 PROCEDURE — 93010 ELECTROCARDIOGRAM REPORT: CPT

## 2019-11-04 PROCEDURE — 99223 1ST HOSP IP/OBS HIGH 75: CPT

## 2019-11-04 RX ORDER — SODIUM CHLORIDE 9 MG/ML
1000 INJECTION INTRAMUSCULAR; INTRAVENOUS; SUBCUTANEOUS
Refills: 0 | Status: COMPLETED | OUTPATIENT
Start: 2019-11-04 | End: 2019-11-04

## 2019-11-04 RX ADMIN — ATORVASTATIN CALCIUM 40 MILLIGRAM(S): 80 TABLET, FILM COATED ORAL at 21:22

## 2019-11-04 RX ADMIN — DONEPEZIL HYDROCHLORIDE 5 MILLIGRAM(S): 10 TABLET, FILM COATED ORAL at 21:22

## 2019-11-04 RX ADMIN — HEPARIN SODIUM 5000 UNIT(S): 5000 INJECTION INTRAVENOUS; SUBCUTANEOUS at 05:58

## 2019-11-04 RX ADMIN — Medication 325 MILLIGRAM(S): at 11:25

## 2019-11-04 RX ADMIN — MEMANTINE HYDROCHLORIDE 10 MILLIGRAM(S): 10 TABLET ORAL at 17:17

## 2019-11-04 RX ADMIN — Medication 10 MILLIGRAM(S): at 11:28

## 2019-11-04 RX ADMIN — SODIUM CHLORIDE 75 MILLILITER(S): 9 INJECTION INTRAMUSCULAR; INTRAVENOUS; SUBCUTANEOUS at 21:00

## 2019-11-04 RX ADMIN — CARVEDILOL PHOSPHATE 6.25 MILLIGRAM(S): 80 CAPSULE, EXTENDED RELEASE ORAL at 05:58

## 2019-11-04 RX ADMIN — SODIUM CHLORIDE 75 MILLILITER(S): 9 INJECTION INTRAMUSCULAR; INTRAVENOUS; SUBCUTANEOUS at 15:26

## 2019-11-04 RX ADMIN — TAMSULOSIN HYDROCHLORIDE 0.4 MILLIGRAM(S): 0.4 CAPSULE ORAL at 21:22

## 2019-11-04 RX ADMIN — Medication 1000 UNIT(S): at 11:25

## 2019-11-04 RX ADMIN — Medication 81 MILLIGRAM(S): at 11:25

## 2019-11-04 RX ADMIN — Medication 50 MICROGRAM(S): at 05:58

## 2019-11-04 RX ADMIN — CARVEDILOL PHOSPHATE 6.25 MILLIGRAM(S): 80 CAPSULE, EXTENDED RELEASE ORAL at 17:17

## 2019-11-04 RX ADMIN — HEPARIN SODIUM 5000 UNIT(S): 5000 INJECTION INTRAVENOUS; SUBCUTANEOUS at 17:17

## 2019-11-04 RX ADMIN — MEMANTINE HYDROCHLORIDE 10 MILLIGRAM(S): 10 TABLET ORAL at 05:58

## 2019-11-04 NOTE — PHYSICAL THERAPY INITIAL EVALUATION ADULT - PERTINENT HX OF CURRENT PROBLEM, REHAB EVAL
Pt admitted to  secondary to syncopal episode. CT head: neg. X-ray pelvis: neg. Pt admitted to  secondary to syncopal episode. CT head: neg. X-ray pelvis: neg. Troponins: 11/3 16:06- 0.073, 20:10- 0.075, 23:07-0.095. Pt admitted to  secondary to syncopal episode. CT head: neg. X-ray pelvis: neg. Troponins: 11/3 16:06- 0.073, 20:10- 0.075, 23:07-0.095. CT c-spine: neg for fx, DDD.

## 2019-11-04 NOTE — CONSULT NOTE ADULT - ASSESSMENT
Syncope - vasovagal. Less likely post convulsive  CAD, s/p CABG  AF, SSS, s/p PPM. Not on anticoagulation sec to fall risk and age  Anemia  CKD  HTN by history    Suggest:    Monitor on tele  Check for orthostatic changes in BP  Lower dose coreg if BP is low  PO hydration. Clinically not in heart failure. Low dose diuretics.   Anemia work up

## 2019-11-04 NOTE — CONSULT NOTE ADULT - SUBJECTIVE AND OBJECTIVE BOX
CC: 89 y old  Male who presents with a chief complaint of Syncope (04 Nov 2019 09:10)      HPI:  89 year old with PMHx of CAD s/p CABG-PPM, CHF and recent admission for syncope  presented to the ED 3 hours after discharge for loss of conscious while having a bowel movement. Son endorses that patient called for help from the bathroom floor, PT does not remember episode but remembers feeling hot before episode and having some posterior neck pain. Son takes patient was incoherent/unresponsive for roughly 5 minutes with fixed and dilated pupils. Patient denies any current chest pain, palpitations, headache, dizziness, unsteady gait, bowel or bladder incontinence.    In the ED patient was noted to have a troponin of 0.073, [place] [address], CT head and neck negative  for acute intracranial process. (03 Nov 2019 19:08)      PAST MEDICAL & SURGICAL HISTORY:  CHF (congestive heart failure)  Artificial pacemaker  S/P CABG (coronary artery bypass graft)      FAMILY HISTORY:  No pertinent family history      Social Hx:  Nonsmoker, no drug or alcohol use    MEDICATIONS  (STANDING):  aspirin enteric coated 81 milliGRAM(s) Oral daily  atorvastatin 40 milliGRAM(s) Oral at bedtime  carvedilol 6.25 milliGRAM(s) Oral every 12 hours  cholecalciferol 1000 Unit(s) Oral daily  donepezil 5 milliGRAM(s) Oral at bedtime  ferrous    sulfate 325 milliGRAM(s) Oral daily  furosemide    Tablet 10 milliGRAM(s) Oral daily  heparin  Injectable 5000 Unit(s) SubCutaneous two times a day  levothyroxine 50 MICROGram(s) Oral daily  memantine 10 milliGRAM(s) Oral two times a day  tamsulosin 0.4 milliGRAM(s) Oral at bedtime       Allergies    No Known Allergies    Intolerances        ROS: Pertinent positives in HPI, all other ROS were reviewed and are negative.      Vital Signs Last 24 Hrs  T(C): 36.7 (04 Nov 2019 05:39), Max: 36.7 (04 Nov 2019 05:39)  T(F): 98 (04 Nov 2019 05:39), Max: 98 (04 Nov 2019 05:39)  HR: 67 (04 Nov 2019 05:39) (58 - 67)  BP: 119/39 (04 Nov 2019 05:39) (119/39 - 160/65)  BP(mean): --  RR: 18 (04 Nov 2019 05:39) (17 - 18)  SpO2: 98% (04 Nov 2019 05:39) (96% - 98%)        Constitutional: awake and alert.  HEENT: PERRLA, EOMI,   Neck: Supple.  Respiratory: Breath sounds are clear bilaterally  Cardiovascular: S1 and S2, regular   Extremities:  no edema  Vascular: Caritid Bruit - no  Musculoskeletal: no joint swelling/tenderness, no abnormal movements  Skin: No rashes    Neurological exam:  HF: A x O x 3. Appropriately interactive, normal affect. Speech fluent, No Aphasia or paraphasic errors. Naming /repetition intact   CN: PELON, EOMI, VFF, facial sensation normal, no NLFD, tongue midline, Palate moves equally, SCM equal bilaterally  Motor: No pronator drift, Strength 5/5 in all 4 ext, normal bulk and tone, no tremor, rigidity or bradykinesia.    Sens: Intact to light touch / PP/ VS/ JS    Reflexes: Symmetric and normal . BJ 2+, BR 2+, KJ 2+, AJ 2+, downgoing toes b/l  Coord:  No FNFA, dysmetria, ELVIN intact   Gait/Balance: No tested    Labs:   11-04    140  |  111<H>  |  38<H>  ----------------------------<  102<H>  3.8   |  25  |  1.66<H>    Ca    8.4<L>      04 Nov 2019 07:10  Phos  2.7     11-04  Mg     2.1     11-04    TPro  6.3  /  Alb  2.9<L>  /  TBili  0.6  /  DBili  x   /  AST  14<L>  /  ALT  18  /  AlkPhos  75  11-04                    8.5    6.51  )-----------( 117      ( 04 Nov 2019 07:10 )             25.9       Radiology:  - CT Head: < from: CT Head No Cont (11.03.19 @ 17:04) >    Mild periventricular white matter ischemia.  Global atrophy   most prominent within the temporal lobes andcerebellum. CC: 89 y old  Male who presents with a chief complaint of Syncope (04 Nov 2019 09:10)      HPI:  89 year old with PMHx of CAD s/p CABG-PPM, CHF and recent admission for syncope presented to the ED 3 hours after discharge for loss of conscious while having a bowel movement.     Pt is unable to recall the event or provide full history, as per his son patient called for help from the bathroom floor, son brought pt to bed, after which he syncopized again, he incoherent/unresponsive for about 5 minutes, had unreactive pupils. PT does not remember episode but remembers feeling hot/clammy before episode and having some neck pain but does not seem sure. Patient denies any chest pain, palpitations, headache, dizziness, unsteady gait, bowel or bladder incontinence, no seizure like activity was witnessed.    In the ED, CT head and neck negative  for acute lesions, patient was at baseline.    On exam today, he is scheduled to have have EEG now, converses well.      PAST MEDICAL & SURGICAL HISTORY:  CHF (congestive heart failure)  Artificial pacemaker  S/P CABG (coronary artery bypass graft)      FAMILY HISTORY:  No pertinent family history      Social Hx:  , lives with son, Nonsmoker, no drug or alcohol use      MEDICATIONS  (STANDING):  aspirin enteric coated 81 milliGRAM(s) Oral daily  atorvastatin 40 milliGRAM(s) Oral at bedtime  carvedilol 6.25 milliGRAM(s) Oral every 12 hours  cholecalciferol 1000 Unit(s) Oral daily  donepezil 5 milliGRAM(s) Oral at bedtime  ferrous    sulfate 325 milliGRAM(s) Oral daily  furosemide    Tablet 10 milliGRAM(s) Oral daily  heparin  Injectable 5000 Unit(s) SubCutaneous two times a day  levothyroxine 50 MICROGram(s) Oral daily  memantine 10 milliGRAM(s) Oral two times a day  tamsulosin 0.4 milliGRAM(s) Oral at bedtime       Allergies    No Known Allergies    Intolerances      ROS: Pertinent positives in HPI, all other ROS - unsteady gait, other ROS negative       Vital Signs Last 24 Hrs  T(C): 36.7 (04 Nov 2019 05:39), Max: 36.7 (04 Nov 2019 05:39)  T(F): 98 (04 Nov 2019 05:39), Max: 98 (04 Nov 2019 05:39)  HR: 67 (04 Nov 2019 05:39) (58 - 67)  BP: 119/39 (04 Nov 2019 05:39) (119/39 - 160/65)  BP(mean): --  RR: 18 (04 Nov 2019 05:39) (17 - 18)  SpO2: 98% (04 Nov 2019 05:39) (96% - 98%)      Gen exam:  NAD, traumatic, awake and alert.  Neck: Supple.  Respiratory: Breath sounds are clear bilaterally  Cardiovascular: S1 and S2, regular   Extremities:  no edema  Vascular: Caritid Bruit - no  Musculoskeletal: no joint swelling/tenderness, no abnormal movements  Skin: No rashes    Neurological exam:  HF: A x O x self, hospital, does not remember season, year, interactive. Speech fluent, No Aphasia. Naming /repetition intact   CN: PELON, EOMI, VFF, facial sensation normal, no NLFD, tongue midline, Palate moves equally, SCM equal bilaterally  Motor: No pronator drift, Strength 5/5 in all 4 ext, normal bulk and tone, no tremor, rigidity    Sens: Intact to light touch / PP  Reflexes: Symmetric and normal .   Coord:  No FNFA,  Gait/Balance: No tested    Labs:   11-04    140  |  111<H>  |  38<H>  ----------------------------<  102<H>  3.8   |  25  |  1.66<H>    Ca    8.4<L>      04 Nov 2019 07:10  Phos  2.7     11-04  Mg     2.1     11-04    TPro  6.3  /  Alb  2.9<L>  /  TBili  0.6  /  DBili  x   /  AST  14<L>  /  ALT  18  /  AlkPhos  75  11-04                    8.5    6.51  )-----------( 117      ( 04 Nov 2019 07:10 )             25.9       Radiology:  - CT Head: < from: CT Head No Cont (11.03.19 @ 17:04) >    Mild periventricular white matter ischemia.  Global atrophy   most prominent within the temporal lobes and cerebellum.

## 2019-11-04 NOTE — PHYSICAL THERAPY INITIAL EVALUATION ADULT - DIAGNOSIS, PT EVAL
Syncope, vasovagal, orthostatics, mechanical, neurogenic(seizure), elevated troponins, acute on chronic kidney disease.

## 2019-11-04 NOTE — CONSULT NOTE ADULT - SUBJECTIVE AND OBJECTIVE BOX
Patient is a 89y old  Male who presents with a chief complaint of Syncope (03 Nov 2019 19:08)      HPI:  89 year old with pertient history of CAD s/p CABG now with pacemaker, CHF and recent admission for syncope  presented to the ED 3 hours after discharge for loss of conscious while having a bowel movement. Son endorses that patient called for help from the bathroom floor. Patient does not remember episode but remembers feeling hot before episode and having some posterior neck pain. Son takes patient was incoherent/unresponsive for roughly 5 minutes with fixed and dilated pupils. Patient denies any current chest pain, palpitations, headache, dizziness, unsteady gait, bowel or bladder incontinence.    In the ED patient was noted to have a troponin of 0.073, [place] [address], CT head and neck negative  for acute intracranial process. (03 Nov 2019 19:08)      PAST MEDICAL & SURGICAL HISTORY:  CHF (congestive heart failure)  Artificial pacemaker  S/P CABG (coronary artery bypass graft)      PREVIOUS CARDIAC WORKUP:      Echo:  Stress Test:  Cardiac Cath:    ALLERGIES:    No Known Allergies       MEDICATIONS  (STANDING):  aspirin enteric coated 81 milliGRAM(s) Oral daily  atorvastatin 40 milliGRAM(s) Oral at bedtime  carvedilol 6.25 milliGRAM(s) Oral every 12 hours  cholecalciferol 1000 Unit(s) Oral daily  donepezil 5 milliGRAM(s) Oral at bedtime  ferrous    sulfate 325 milliGRAM(s) Oral daily  furosemide    Tablet 10 milliGRAM(s) Oral daily  heparin  Injectable 5000 Unit(s) SubCutaneous two times a day  levothyroxine 50 MICROGram(s) Oral daily  memantine 10 milliGRAM(s) Oral two times a day  tamsulosin 0.4 milliGRAM(s) Oral at bedtime    MEDICATIONS  (PRN):      FAMILY HISTORY:  No pertinent family history        SOCIAL HISTORY:  .scl     ROS:     .ros    Vital Signs Last 24 Hrs  T(C): 36.7 (04 Nov 2019 05:39), Max: 36.7 (04 Nov 2019 05:39)  T(F): 98 (04 Nov 2019 05:39), Max: 98 (04 Nov 2019 05:39)  HR: 67 (04 Nov 2019 05:39) (58 - 67)  BP: 119/39 (04 Nov 2019 05:39) (119/39 - 160/65)  BP(mean): --  RR: 18 (04 Nov 2019 05:39) (17 - 18)  SpO2: 98% (04 Nov 2019 05:39) (96% - 98%)    I&O's Summary      PHYSICAL EXAM:    .phy      TELEMETRY:    ECG:    LABS:                          8.5    6.51  )-----------( 117      ( 04 Nov 2019 07:10 )             25.9     11-04    140  |  111<H>  |  38<H>  ----------------------------<  102<H>  3.8   |  25  |  1.66<H>    Ca    8.4<L>      04 Nov 2019 07:10  Phos  2.7     11-04  Mg     2.1     11-04    TPro  6.3  /  Alb  2.9<L>  /  TBili  0.6  /  DBili  x   /  AST  14<L>  /  ALT  18  /  AlkPhos  75  11-04 11-03 @ 23:07  Trop-I  0.095    11-03 @ 20:10  Trop-I  0.075    11-03 @ 16:43  CK      87    11-03 @ 16:06  Trop-I  0.073      Pro BNP  55307 11-03 @ 16:06  Pro BNP  9770 11-01 @ 15:45    PT/INR - ( 04 Nov 2019 07:10 )   PT: 13.6 sec;   INR: 1.22 ratio    PTT - ( 03 Nov 2019 16:06 )  PTT:29.0 sec      RADIOLOGY & ADDITIONAL STUDIES:    CT Head No Cont (11.03.19 @ 17:04) >  IMPRESSION:   Mild periventricular white matter ischemia.  Global atrophy most prominent within the temporal lobes and cerebellum. Patient is a 89y old  Male who presents with a chief complaint of Syncope (03 Nov 2019 19:08)      HPI:  89 yo male with syncopal episode while on the commode defecating. He remembers feeling hot before the episode but has no other recollection of the episode. He has occipital area discomfort from hitting his head. He was just discharged from this hospital yesterday after having been admitted with dizziness and low blood pressure which was attributed to dehydration. Prior to that he had a possible syncopal fall few days ago and had a right occipital area laceration. Currently asymptomatic. Paced rhythm on monitor and no arrhythmias noted.       PAST MEDICAL & SURGICAL HISTORY:    •	Atrial fibrillation	 	  •	Broken ribs	 	   	Fell and broken ribs, hospital for 4 days.	  •	CAD (coronary artery disease)	2003	   	S/P CABG. 	  •	Carotid arterial disease	 	   	B/L ICA 1-39%; Last Carotid 9/2014	  •	Chronic diastolic CHF (congestive heart failure)	 	  •	Current non-smoker but past smoking history unknown	 	  •	Former smoker	 	  •	Hyperlipidemia	 	  •	Hypertension	 	   	Last Stress Test 5/2008	  •	LV dysfunction	 	   	Last Echo 8/17/2015	  •	Mitral annular calcification	 	  •	MR (mitral regurgitation)	 	   	mild to moderate	  •	Nonischemic cardiomyopathy	 	  •	S/P AVR (aortic valve replacement)	2003	   	Bioprosthetic	  •	S/P placement of cardiac pacemaker	April 29 2014	   	[employer]	  •	SSS (sick sinus syndrome)	 	  •	TR (tricuspid regurgitation)	 	   	Mild	      PREVIOUS CARDIAC WORKUP:      Echo:  10/18  --Technically difficult study.  --There is severe left atrial dilatation (LA volume index 59 ml/m²).  --There is moderate left ventricular hypertrophy.  --Global LV wall motion is mildly hypokinetic.  --LV ejection fraction is borderline normal.  --There is right atrial dilatation.  --There is a pacemaker in the right heart.  --The aortic root is normal in size (3.7 cm). 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  --There is a bioprosthesis in the aortic valve position. The function of the aortic valve prosthesis is normal. There is mild aortic regurgitation.  --There is mitral annular calcification. There is mild mitral valve thickening. There is mild to moderate mitral regurgitation.  --There is moderate tricuspid regurgitation.  --There is mild pulmonic regurgitation.  --The right atrial pressure is 6 - 10 mm Hg. There is moderate pulmonary hypertension.  --There is no pericardial effusion.      ALLERGIES:    No Known Allergies       MEDICATIONS  (STANDING):  aspirin enteric coated 81 milliGRAM(s) Oral daily  atorvastatin 40 milliGRAM(s) Oral at bedtime  carvedilol 6.25 milliGRAM(s) Oral every 12 hours  cholecalciferol 1000 Unit(s) Oral daily  donepezil 5 milliGRAM(s) Oral at bedtime  ferrous    sulfate 325 milliGRAM(s) Oral daily  furosemide    Tablet 10 milliGRAM(s) Oral daily  heparin  Injectable 5000 Unit(s) SubCutaneous two times a day  levothyroxine 50 MICROGram(s) Oral daily  memantine 10 milliGRAM(s) Oral two times a day  tamsulosin 0.4 milliGRAM(s) Oral at bedtime      FAMILY HISTORY:  No pertinent family history        SOCIAL HISTORY:  Nonsmoker. No ETOH abuse. No illicit drugs.     ROS:     Detailed ten system ROS was performed and was negative except for history as eluded to above.    no fever  no chills  no nausea  no vomiting  no diarrhea  no constipation  no melena  no hematochezia  no chest pain  no palpitations  no sob at rest  no dyspnea on exertion  no cough  no wheezing  no anorexia  no headache  no dizziness  + syncope  no weakness  no myalgia  no dysuria  no polyuria  no hematuria     Vital Signs Last 24 Hrs  T(C): 36.7 (04 Nov 2019 05:39), Max: 36.7 (04 Nov 2019 05:39)  T(F): 98 (04 Nov 2019 05:39), Max: 98 (04 Nov 2019 05:39)  HR: 67 (04 Nov 2019 05:39) (58 - 67)  BP: 119/39 (04 Nov 2019 05:39) (119/39 - 160/65)  RR: 18 (04 Nov 2019 05:39) (17 - 18)  SpO2: 98% (04 Nov 2019 05:39) (96% - 98%)      PHYSICAL EXAM:    General:                Comfortable, AAO X 3, in no distress.   HEENT:                  R occipital laceration, PERRLA, EOMI, conjunctiva clear. Pallor  Neck:                     Supple, no adenopathy, no thyromegaly, no JVD, no bruit.  Back:                     Symmetric, non tender.  Chest:                    Clear, B/L symmetric air entry, no tachypnea  Heart:                     S1, S2 normal, no gallop, + murmur.  Abdomen:              Soft, non-tender, bowel sounds active. No palpable masses.  Extremities:           no cyanosis, no edema. Peripheral pulses normal.  Skin:                      Skin color, texture normal. No rashes.  Neurologic:            Grossly nonfocal.       TELEMETRY:  V paced - no arrhythmias    ECG: V paced      LABS:                          8.5    6.51  )-----------( 117      ( 04 Nov 2019 07:10 )             25.9     11-04    140  |  111<H>  |  38<H>  ----------------------------<  102<H>  3.8   |  25  |  1.66<H>    Ca    8.4<L>      04 Nov 2019 07:10  Phos  2.7     11-04  Mg     2.1     11-04    TPro  6.3  /  Alb  2.9<L>  /  TBili  0.6  /  DBili  x   /  AST  14<L>  /  ALT  18  /  AlkPhos  75  11-04 11-03 @ 23:07  Trop-I  0.095    11-03 @ 20:10  Trop-I  0.075    11-03 @ 16:43  CK      87    11-03 @ 16:06  Trop-I  0.073      Pro BNP  05896 11-03 @ 16:06  Pro BNP  9770 11-01 @ 15:45    PT/INR - ( 04 Nov 2019 07:10 )   PT: 13.6 sec;   INR: 1.22 ratio    PTT - ( 03 Nov 2019 16:06 )  PTT:29.0 sec      RADIOLOGY & ADDITIONAL STUDIES:    CT Head No Cont (11.03.19 @ 17:04) >  IMPRESSION:   Mild periventricular white matter ischemia.  Global atrophy most prominent within the temporal lobes and cerebellum.

## 2019-11-04 NOTE — PROGRESS NOTE ADULT - SUBJECTIVE AND OBJECTIVE BOX
c/c: Syncope      HPI: 89 y/o male with a PMHx of artificial pacemaker, CAD s/p CABG, Diastolic  CHF, Alzheimer's disease recent admission for syncope  presented to the ED 3 hours after discharge for loss of conscious while having a bowel movement. Son endorses that patient called for help from the bathroom floor. Patient does not remember episode but remembers feeling hot before episode and having some posterior neck pain. Son takes patient was incoherent/unresponsive for roughly 5 minutes with fixed and dilated pupils. Patient denies any current chest pain, palpitations, headache, dizziness, unsteady gait, bowel or bladder incontinence.    In the ED patient was noted to have a troponin of 0.073, [place] [address], CT head and neck negative  for acute intracranial process.     : pt seen and examined earlier. Sleeping but arousable. Denies pain/sob/chest pain.      Review of system- All 10 systems reviewed and is as per HPI otherwise negative but limited d/t lethargy    VITALS  T(C): 37 (19 @ 10:10), Max: 37 (19 @ 10:10)  HR: 59 (19 @ 10:10) (58 - 67)  BP: 125/57 (19 @ 10:10) (119/39 - 160/65)  RR: 18 (19 @ 10:10) (17 - 18)  SpO2: 96% (19 @ 10:10) (96% - 98%)    PHYSICAL EXAM:    GENERAL: sleeping but arousable.  HEAD:  Atraumatic, Normocephalic  EYES: EOMI, PERRLA  HEENT: Moist mucous membranes  NECK: Supple, No JVD  NERVOUS SYSTEM: non focal  CHEST/LUNG: Clear to auscultation bilaterally  HEART: Regular rate and rhythm; No murmurs, rubs, or gallops  ABDOMEN: Soft, Nontender, Nondistended; Bowel sounds present  GENITOURINARY- Voiding, no palpable bladder  EXTREMITIES:  No clubbing, cyanosis, or edema  MUSCULOSKELETAL- No muscle tenderness, No joint tenderness  SKIN-no rash        LABS:                        8.5    6.51  )-----------( 117      ( 2019 07:10 )             25.9     -    140  |  111<H>  |  38<H>  ----------------------------<  102<H>  3.8   |  25  |  1.66<H>    Ca    8.4<L>      2019 07:10  Phos  2.7     11-  Mg     2.1     11-    TPro  6.3  /  Alb  2.9<L>  /  TBili  0.6  /  DBili  x   /  AST  14<L>  /  ALT  18  /  AlkPhos  75  11-04    PT/INR - ( 2019 07:10 )   PT: 13.6 sec;   INR: 1.22 ratio         PTT - ( 2019 16:06 )  PTT:29.0 sec  Urinalysis Basic - ( 2019 17:21 )    Color: Yellow / Appearance: Slightly Turbid / S.020 / pH: x  Gluc: x / Ketone: Trace  / Bili: Negative / Urobili: 1 mg/dL   Blood: x / Protein: 100 mg/dL / Nitrite: Negative   Leuk Esterase: Trace / RBC: Negative /HPF / WBC 0-2   Sq Epi: x / Non Sq Epi: Occasional / Bacteria: Many      CARDIAC MARKERS ( 2019 23:07 )  0.095 ng/mL / x     / x     / x     / x      CARDIAC MARKERS ( 2019 20:10 )  0.075 ng/mL / x     / x     / x     / x      CARDIAC MARKERS ( 2019 16:43 )  x     / x     / 87 U/L / x     / x      CARDIAC MARKERS ( 2019 16:06 )  0.073 ng/mL / x     / x     / x     / x            MEDS  aspirin enteric coated 81 milliGRAM(s) Oral daily  atorvastatin 40 milliGRAM(s) Oral at bedtime  carvedilol 6.25 milliGRAM(s) Oral every 12 hours  cholecalciferol 1000 Unit(s) Oral daily  donepezil 5 milliGRAM(s) Oral at bedtime  ferrous    sulfate 325 milliGRAM(s) Oral daily  furosemide    Tablet 10 milliGRAM(s) Oral daily  heparin  Injectable 5000 Unit(s) SubCutaneous two times a day  levothyroxine 50 MICROGram(s) Oral daily  memantine 10 milliGRAM(s) Oral two times a day  tamsulosin 0.4 milliGRAM(s) Oral at bedtime    ASSESSMENT AND PLAN:  89m, PMH as above a/w:    1. Syncope:  -likely vasovagal by history  -orthostatics ordered earlier +.   -clinically dry  -hold lasix, gentle ivf, repeat orthostatics in am. may need to dc flomax.  -PPM interrogation negative.   -f/u eeg    2. Diastolic CHf:  -compensated  -hold further lasix today    3. CAD/CABG:  -continue asa/bb/statin    4. Dementia:  -continue aricept/namenda    5. DVT px    6. Dispo:  -gentle ivf, repeat orthostatics in am if still positive and euvolemic, dc flomax  -PT eval c/c: Syncope      HPI: 89 y/o male with a PMHx of artificial pacemaker, CAD s/p CABG, Diastolic  CHF, Alzheimer's disease recent admission for syncope  presented to the ED 3 hours after discharge for loss of conscious while having a bowel movement. Son endorses that patient called for help from the bathroom floor. Patient does not remember episode but remembers feeling hot before episode and having some posterior neck pain. Son takes patient was incoherent/unresponsive for roughly 5 minutes with fixed and dilated pupils. Patient denies any current chest pain, palpitations, headache, dizziness, unsteady gait, bowel or bladder incontinence.    In the ED patient was noted to have a troponin of 0.073, [place] of [address], CT head and neck negative  for acute intracranial process.     : pt seen and examined earlier. Sleeping but arousable. Denies pain/sob/chest pain.      Review of system- All 10 systems reviewed and is as per HPI otherwise negative but limited d/t lethargy    VITALS  T(C): 37 (19 @ 10:10), Max: 37 (19 @ 10:10)  HR: 59 (19 @ 10:10) (58 - 67)  BP: 125/57 (19 @ 10:10) (119/39 - 160/65)  RR: 18 (19 @ 10:10) (17 - 18)  SpO2: 96% (19 @ 10:10) (96% - 98%)    PHYSICAL EXAM:    GENERAL: sleeping but arousable.  HEAD:  Atraumatic, Normocephalic  EYES: EOMI, PERRLA  HEENT: Moist mucous membranes  NECK: Supple, No JVD  NERVOUS SYSTEM: non focal  CHEST/LUNG: Clear to auscultation bilaterally  HEART: Regular rate and rhythm; No murmurs, rubs, or gallops  ABDOMEN: Soft, Nontender, Nondistended; Bowel sounds present  GENITOURINARY- Voiding, no palpable bladder  EXTREMITIES:  No clubbing, cyanosis, or edema  MUSCULOSKELETAL- No muscle tenderness, No joint tenderness  SKIN-no rash        LABS:                        8.5    6.51  )-----------( 117      ( 2019 07:10 )             25.9     -    140  |  111<H>  |  38<H>  ----------------------------<  102<H>  3.8   |  25  |  1.66<H>    Ca    8.4<L>      2019 07:10  Phos  2.7     11-  Mg     2.1     11-    TPro  6.3  /  Alb  2.9<L>  /  TBili  0.6  /  DBili  x   /  AST  14<L>  /  ALT  18  /  AlkPhos  75  11-04    PT/INR - ( 2019 07:10 )   PT: 13.6 sec;   INR: 1.22 ratio         PTT - ( 2019 16:06 )  PTT:29.0 sec  Urinalysis Basic - ( 2019 17:21 )    Color: Yellow / Appearance: Slightly Turbid / S.020 / pH: x  Gluc: x / Ketone: Trace  / Bili: Negative / Urobili: 1 mg/dL   Blood: x / Protein: 100 mg/dL / Nitrite: Negative   Leuk Esterase: Trace / RBC: Negative /HPF / WBC 0-2   Sq Epi: x / Non Sq Epi: Occasional / Bacteria: Many      CARDIAC MARKERS ( 2019 23:07 )  0.095 ng/mL / x     / x     / x     / x      CARDIAC MARKERS ( 2019 20:10 )  0.075 ng/mL / x     / x     / x     / x      CARDIAC MARKERS ( 2019 16:43 )  x     / x     / 87 U/L / x     / x      CARDIAC MARKERS ( 2019 16:06 )  0.073 ng/mL / x     / x     / x     / x            MEDS  aspirin enteric coated 81 milliGRAM(s) Oral daily  atorvastatin 40 milliGRAM(s) Oral at bedtime  carvedilol 6.25 milliGRAM(s) Oral every 12 hours  cholecalciferol 1000 Unit(s) Oral daily  donepezil 5 milliGRAM(s) Oral at bedtime  ferrous    sulfate 325 milliGRAM(s) Oral daily  furosemide    Tablet 10 milliGRAM(s) Oral daily  heparin  Injectable 5000 Unit(s) SubCutaneous two times a day  levothyroxine 50 MICROGram(s) Oral daily  memantine 10 milliGRAM(s) Oral two times a day  tamsulosin 0.4 milliGRAM(s) Oral at bedtime    ASSESSMENT AND PLAN:  89m, PMH as above a/w:    1. Syncope:  -likely vasovagal by history  -orthostatics ordered earlier +.   -clinically dry  -hold lasix, gentle ivf, repeat orthostatics in am. may need to dc flomax.  -PPM interrogation negative.   -f/u eeg    2. Diastolic CHf:  -compensated  -hold further lasix today    3. Anemia:  -fobt negative on previous admission  -check iron studies/b12/folate/retic/etc.    4. CAD/CABG:  -continue asa/bb/statin    5. Dementia:  -continue aricept/namenda    6. DVT px    7. Dispo:  -gentle ivf, repeat orthostatics in am if still positive and euvolemic, dc flomax  -PT eval

## 2019-11-04 NOTE — CONSULT NOTE ADULT - ASSESSMENT
89 year old with PMHx of CAD s/p CABG-PPM, CHF, recently discharged home for syncope presented to the ED 3 hours after discharge for loss of conscious while having a bowel movement.     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

## 2019-11-05 LAB
ANION GAP SERPL CALC-SCNC: 8 MMOL/L — SIGNIFICANT CHANGE UP (ref 5–17)
APPEARANCE UR: CLEAR — SIGNIFICANT CHANGE UP
BASOPHILS # BLD AUTO: 0.03 K/UL — SIGNIFICANT CHANGE UP (ref 0–0.2)
BASOPHILS NFR BLD AUTO: 0.3 % — SIGNIFICANT CHANGE UP (ref 0–2)
BILIRUB UR-MCNC: NEGATIVE — SIGNIFICANT CHANGE UP
BUN SERPL-MCNC: 41 MG/DL — HIGH (ref 7–23)
CALCIUM SERPL-MCNC: 8.4 MG/DL — LOW (ref 8.5–10.1)
CHLORIDE SERPL-SCNC: 113 MMOL/L — HIGH (ref 96–108)
CO2 SERPL-SCNC: 21 MMOL/L — LOW (ref 22–31)
COLOR SPEC: YELLOW — SIGNIFICANT CHANGE UP
CREAT SERPL-MCNC: 1.63 MG/DL — HIGH (ref 0.5–1.3)
DIFF PNL FLD: ABNORMAL
DIR ANTIGLOB POLYSPECIFIC INTERPRETATION: SIGNIFICANT CHANGE UP
EOSINOPHIL # BLD AUTO: 0.15 K/UL — SIGNIFICANT CHANGE UP (ref 0–0.5)
EOSINOPHIL NFR BLD AUTO: 1.3 % — SIGNIFICANT CHANGE UP (ref 0–6)
FERRITIN SERPL-MCNC: 266 NG/ML — SIGNIFICANT CHANGE UP (ref 30–400)
FOLATE SERPL-MCNC: 10.2 NG/ML — SIGNIFICANT CHANGE UP
GLUCOSE SERPL-MCNC: 109 MG/DL — HIGH (ref 70–99)
GLUCOSE UR QL: NEGATIVE MG/DL — SIGNIFICANT CHANGE UP
HCT VFR BLD CALC: 26 % — LOW (ref 39–50)
HGB BLD-MCNC: 8.5 G/DL — LOW (ref 13–17)
IMM GRANULOCYTES NFR BLD AUTO: 0.4 % — SIGNIFICANT CHANGE UP (ref 0–1.5)
IRON SATN MFR SERPL: 12 % — LOW (ref 16–55)
IRON SATN MFR SERPL: 27 UG/DL — LOW (ref 45–165)
KETONES UR-MCNC: NEGATIVE — SIGNIFICANT CHANGE UP
LEUKOCYTE ESTERASE UR-ACNC: ABNORMAL
LYMPHOCYTES # BLD AUTO: 0.81 K/UL — LOW (ref 1–3.3)
LYMPHOCYTES # BLD AUTO: 7.1 % — LOW (ref 13–44)
MAGNESIUM SERPL-MCNC: 2.1 MG/DL — SIGNIFICANT CHANGE UP (ref 1.6–2.6)
MCHC RBC-ENTMCNC: 32.4 PG — SIGNIFICANT CHANGE UP (ref 27–34)
MCHC RBC-ENTMCNC: 32.7 GM/DL — SIGNIFICANT CHANGE UP (ref 32–36)
MCV RBC AUTO: 99.2 FL — SIGNIFICANT CHANGE UP (ref 80–100)
MONOCYTES # BLD AUTO: 1.11 K/UL — HIGH (ref 0–0.9)
MONOCYTES NFR BLD AUTO: 9.7 % — SIGNIFICANT CHANGE UP (ref 2–14)
NEUTROPHILS # BLD AUTO: 9.3 K/UL — HIGH (ref 1.8–7.4)
NEUTROPHILS NFR BLD AUTO: 81.2 % — HIGH (ref 43–77)
NITRITE UR-MCNC: NEGATIVE — SIGNIFICANT CHANGE UP
OB PNL STL: NEGATIVE — SIGNIFICANT CHANGE UP
PH UR: 5 — SIGNIFICANT CHANGE UP (ref 5–8)
PHOSPHATE SERPL-MCNC: 3.1 MG/DL — SIGNIFICANT CHANGE UP (ref 2.5–4.5)
PLATELET # BLD AUTO: 122 K/UL — LOW (ref 150–400)
POTASSIUM SERPL-MCNC: 4.3 MMOL/L — SIGNIFICANT CHANGE UP (ref 3.5–5.3)
POTASSIUM SERPL-SCNC: 4.3 MMOL/L — SIGNIFICANT CHANGE UP (ref 3.5–5.3)
PROT UR-MCNC: 30 MG/DL
RBC # BLD: 2.62 M/UL — LOW (ref 4.2–5.8)
RBC # BLD: 2.62 M/UL — LOW (ref 4.2–5.8)
RBC # FLD: 15.8 % — HIGH (ref 10.3–14.5)
RETICS #: 66 K/UL — SIGNIFICANT CHANGE UP (ref 25–125)
RETICS/RBC NFR: 2.5 % — SIGNIFICANT CHANGE UP (ref 0.5–2.5)
SODIUM SERPL-SCNC: 142 MMOL/L — SIGNIFICANT CHANGE UP (ref 135–145)
SP GR SPEC: 1.01 — SIGNIFICANT CHANGE UP (ref 1.01–1.02)
TIBC SERPL-MCNC: 221 UG/DL — SIGNIFICANT CHANGE UP (ref 220–430)
UIBC SERPL-MCNC: 194 UG/DL — SIGNIFICANT CHANGE UP (ref 110–370)
UROBILINOGEN FLD QL: NEGATIVE MG/DL — SIGNIFICANT CHANGE UP
VIT B12 SERPL-MCNC: 437 PG/ML — SIGNIFICANT CHANGE UP (ref 232–1245)
WBC # BLD: 11.45 K/UL — HIGH (ref 3.8–10.5)
WBC # FLD AUTO: 11.45 K/UL — HIGH (ref 3.8–10.5)

## 2019-11-05 PROCEDURE — 71045 X-RAY EXAM CHEST 1 VIEW: CPT | Mod: 26

## 2019-11-05 PROCEDURE — 93306 TTE W/DOPPLER COMPLETE: CPT | Mod: 26

## 2019-11-05 RX ADMIN — CARVEDILOL PHOSPHATE 6.25 MILLIGRAM(S): 80 CAPSULE, EXTENDED RELEASE ORAL at 18:17

## 2019-11-05 RX ADMIN — Medication 50 MICROGRAM(S): at 05:25

## 2019-11-05 RX ADMIN — TAMSULOSIN HYDROCHLORIDE 0.4 MILLIGRAM(S): 0.4 CAPSULE ORAL at 21:17

## 2019-11-05 RX ADMIN — MEMANTINE HYDROCHLORIDE 10 MILLIGRAM(S): 10 TABLET ORAL at 18:18

## 2019-11-05 RX ADMIN — Medication 325 MILLIGRAM(S): at 12:26

## 2019-11-05 RX ADMIN — HEPARIN SODIUM 5000 UNIT(S): 5000 INJECTION INTRAVENOUS; SUBCUTANEOUS at 05:25

## 2019-11-05 RX ADMIN — Medication 81 MILLIGRAM(S): at 12:25

## 2019-11-05 RX ADMIN — MEMANTINE HYDROCHLORIDE 10 MILLIGRAM(S): 10 TABLET ORAL at 05:25

## 2019-11-05 RX ADMIN — CARVEDILOL PHOSPHATE 6.25 MILLIGRAM(S): 80 CAPSULE, EXTENDED RELEASE ORAL at 05:25

## 2019-11-05 RX ADMIN — Medication 1000 UNIT(S): at 12:26

## 2019-11-05 RX ADMIN — DONEPEZIL HYDROCHLORIDE 5 MILLIGRAM(S): 10 TABLET, FILM COATED ORAL at 21:17

## 2019-11-05 RX ADMIN — HEPARIN SODIUM 5000 UNIT(S): 5000 INJECTION INTRAVENOUS; SUBCUTANEOUS at 18:17

## 2019-11-05 RX ADMIN — ATORVASTATIN CALCIUM 40 MILLIGRAM(S): 80 TABLET, FILM COATED ORAL at 21:17

## 2019-11-05 NOTE — PROGRESS NOTE ADULT - SUBJECTIVE AND OBJECTIVE BOX
INTERVAL HPI/OVERNIGHT EVENTS:  89 y/o male with a PMHx of artificial pacemaker, CAD s/p CABG, Diastolic  CHF, Alzheimer's disease recent admission for syncope  presented to the ED 3 hours after discharge for loss of conscious while having a bowel movement. Son endorses that patient called for help from the bathroom floor. Patient does not remember episode but remembers feeling hot before episode and having some posterior neck pain. Son takes patient was incoherent/unresponsive for roughly 5 minutes with fixed and dilated pupils. Patient denies any current chest pain, palpitations, headache, dizziness, unsteady gait, bowel or bladder incontinence.    In the ED patient was noted to have a troponin of 0.073, [place]-BNP of [address], CT head and neck negative  for acute intracranial process.     : pt seen and examined earlier. Sleeping but arousable. Denies pain/sob/chest pain.      Review of system- All 10 systems reviewed and is as per HPI otherwise negative but limited d/t lethargy      MEDICATIONS  (STANDING):  aspirin enteric coated 81 milliGRAM(s) Oral daily  atorvastatin 40 milliGRAM(s) Oral at bedtime  carvedilol 6.25 milliGRAM(s) Oral every 12 hours  cholecalciferol 1000 Unit(s) Oral daily  donepezil 5 milliGRAM(s) Oral at bedtime  ferrous    sulfate 325 milliGRAM(s) Oral daily  heparin  Injectable 5000 Unit(s) SubCutaneous two times a day  levothyroxine 50 MICROGram(s) Oral daily  memantine 10 milliGRAM(s) Oral two times a day  tamsulosin 0.4 milliGRAM(s) Oral at bedtime    MEDICATIONS  (PRN):      Allergies    No Known Allergies    Intolerances        CONSTITUTIONAL: No weakness, fevers or chills  EYES/ENT: No visual changes;  No vertigo or throat pain   NECK: No pain or stiffness  RESPIRATORY: No cough, wheezing, hemoptysis; No shortness of breath  CARDIOVASCULAR: No chest pain or palpitations  GASTROINTESTINAL: No abdominal or epigastric pain. No nausea, vomiting, or hematemesis; No diarrhea or constipation. No melena or hematochezia.  GENITOURINARY: No dysuria, frequency or hematuria  NEUROLOGICAL: No numbness or weakness  SKIN: No itching, burning, rashes, or lesions   All other review of systems is negative unless indicated above.    Vital Signs Last 24 Hrs  T(C): 36.6 (2019 10:15), Max: 36.9 (2019 17:09)  T(F): 97.9 (2019 10:15), Max: 98.4 (2019 17:09)  HR: 59 (2019 10:15) (59 - 60)  BP: 118/44 (2019 10:15) (118/44 - 127/52)  BP(mean): --  RR: 17 (2019 10:15) (17 - 18)  SpO2: 97% (2019 10:15) (93% - 99%)     @ 07:01  -   @ 07:00  --------------------------------------------------------  IN: 251 mL / OUT: 250 mL / NET: 1 mL        General: WN/WD NAD  Neurology: A&Ox3, nonfocal, RENDON x 4  Respiratory: CTA B/L  CV: RRR, S1S2, no murmurs, rubs or gallops  Abdominal: Soft, NT, ND +BS, Last BM  Extremities: No edema, + peripheral pulses      LABS:                        8.5    11.45 )-----------( 122      ( 2019 07:39 )             26.0     11-    142  |  113<H>  |  41<H>  ----------------------------<  109<H>  4.3   |  21<L>  |  1.63<H>    Ca    8.4<L>      2019 07:39  Phos  3.1     11-  Mg     2.1     11-    TPro  6.3  /  Alb  2.9<L>  /  TBili  0.6  /  DBili  x   /  AST  14<L>  /  ALT  18  /  AlkPhos  75  11-04    PT/INR - ( 2019 07:10 )   PT: 13.6 sec;   INR: 1.22 ratio           Urinalysis Basic - ( 2019 17:21 )    Color: Yellow / Appearance: Slightly Turbid / S.020 / pH: x  Gluc: x / Ketone: Trace  / Bili: Negative / Urobili: 1 mg/dL   Blood: x / Protein: 100 mg/dL / Nitrite: Negative   Leuk Esterase: Trace / RBC: Negative /HPF / WBC 0-2   Sq Epi: x / Non Sq Epi: Occasional / Bacteria: Many        RADIOLOGY & ADDITIONAL TESTS:

## 2019-11-05 NOTE — PROGRESS NOTE ADULT - SUBJECTIVE AND OBJECTIVE BOX
CURRENT CARDIAC WORKUP:       Echo:  Stress Test:  Cardiac Cath:    Allergies:   No Known Allergies      MEDICATIONS  (STANDING):  aspirin enteric coated 81 milliGRAM(s) Oral daily  atorvastatin 40 milliGRAM(s) Oral at bedtime  carvedilol 6.25 milliGRAM(s) Oral every 12 hours  cholecalciferol 1000 Unit(s) Oral daily  donepezil 5 milliGRAM(s) Oral at bedtime  ferrous    sulfate 325 milliGRAM(s) Oral daily  heparin  Injectable 5000 Unit(s) SubCutaneous two times a day  levothyroxine 50 MICROGram(s) Oral daily  memantine 10 milliGRAM(s) Oral two times a day  tamsulosin 0.4 milliGRAM(s) Oral at bedtime    MEDICATIONS  (PRN):      ROS:     .ros      Vital Signs Last 24 Hrs  T(C): 36.9 (2019 05:15), Max: 37 (2019 10:10)  T(F): 98.4 (2019 05:15), Max: 98.6 (2019 10:10)  HR: 60 (2019 05:15) (59 - 60)  BP: 126/55 (2019 05:15) (122/52 - 127/52)  BP(mean): --  RR: 18 (2019 05:15) (18 - 18)  SpO2: 93% (2019 05:15) (93% - 99%)    I&O's Summary    2019 07:01  -  2019 07:00  --------------------------------------------------------  IN: 251 mL / OUT: 250 mL / NET: 1 mL        PHYSICAL EXAM:    .phy      TELEMETRY:    ECG:    LABS:                        8.5    11.45 )-----------( 122      ( 2019 07:39 )             26.0     11-05    142  |  113<H>  |  41<H>  ----------------------------<  109<H>  4.3   |  21<L>  |  1.63<H>    Ca    8.4<L>      2019 07:39  Phos  3.1       Mg     2.1         TPro  6.3  /  Alb  2.9<L>  /  TBili  0.6  /  DBili  x   /  AST  14<L>  /  ALT  18  /  AlkPhos  75   @ 23:07  Trop-I 0.095  CK     --  CK MB  --     @ 20:10  Trop-I 0.075  CK     --  CK MB  --     @ 16:43  Trop-I --  CK     87  CK MB  --     @ 16:06  Trop-I 0.073  CK     --  CK MB  --     @ 07:51  Trop-I 0.067  CK     --  CK MB  --     @ 15:45  Trop-I 0.075  CK     --  CK MB  --    Pro BNP  52894  @ 16:06  D Dimer  --  @ 16:06  Pro BNP  9770  @ 15:45  D Dimer  --  @ 15:45    PT/INR - ( 2019 07:10 )   PT: 13.6 sec;   INR: 1.22 ratio         PTT - ( 2019 16:06 )  PTT:29.0 sec  Urinalysis Basic - ( 2019 17:21 )    Color: Yellow / Appearance: Slightly Turbid / S.020 / pH: x  Gluc: x / Ketone: Trace  / Bili: Negative / Urobili: 1 mg/dL   Blood: x / Protein: 100 mg/dL / Nitrite: Negative   Leuk Esterase: Trace / RBC: Negative /HPF / WBC 0-2   Sq Epi: x / Non Sq Epi: Occasional / Bacteria: Many            RADIOLOGY & ADDITIONAL STUDIES:

## 2019-11-05 NOTE — PROGRESS NOTE ADULT - ASSESSMENT
1. Syncope:  -likely vasovagal by history  -orthostatics ordered earlier +.   -clinically dry  -hold lasix, gentle ivf, repeat orthostatics in am. may need to dc flomax.  -PPM interrogation negative.   -f/u eeg    2. Diastolic CHf:  -compensated  -hold further lasix     3. Anemia:  -fobt negative on previous admission  -check iron studies/b12/folate/retic/etc.  -Likely iron def anemia- continue iron supplementation    4. CAD/CABG:  -continue asa/bb/statin    5. Dementia:  -continue aricept/namenda    6. DVT px    7. Dispo:  -gentle ivf, repeat orthostatics in am if still positive and euvolemic, dc flomax  -PT eval    8. Leukocytosis  -Elevation in WBC today- no fever, no cough, no dysuria  -Will check UA, CXR, monitor for signs of infection, observe off abx for now

## 2019-11-06 LAB
ANION GAP SERPL CALC-SCNC: 7 MMOL/L — SIGNIFICANT CHANGE UP (ref 5–17)
BUN SERPL-MCNC: 43 MG/DL — HIGH (ref 7–23)
CALCIUM SERPL-MCNC: 8.6 MG/DL — SIGNIFICANT CHANGE UP (ref 8.5–10.1)
CHLORIDE SERPL-SCNC: 113 MMOL/L — HIGH (ref 96–108)
CO2 SERPL-SCNC: 22 MMOL/L — SIGNIFICANT CHANGE UP (ref 22–31)
CREAT SERPL-MCNC: 1.69 MG/DL — HIGH (ref 0.5–1.3)
GLUCOSE SERPL-MCNC: 120 MG/DL — HIGH (ref 70–99)
HCT VFR BLD CALC: 26.3 % — LOW (ref 39–50)
HGB BLD-MCNC: 8.4 G/DL — LOW (ref 13–17)
MCHC RBC-ENTMCNC: 31.6 PG — SIGNIFICANT CHANGE UP (ref 27–34)
MCHC RBC-ENTMCNC: 31.9 GM/DL — LOW (ref 32–36)
MCV RBC AUTO: 98.9 FL — SIGNIFICANT CHANGE UP (ref 80–100)
PLATELET # BLD AUTO: 122 K/UL — LOW (ref 150–400)
POTASSIUM SERPL-MCNC: 4.3 MMOL/L — SIGNIFICANT CHANGE UP (ref 3.5–5.3)
POTASSIUM SERPL-SCNC: 4.3 MMOL/L — SIGNIFICANT CHANGE UP (ref 3.5–5.3)
RBC # BLD: 2.66 M/UL — LOW (ref 4.2–5.8)
RBC # FLD: 16 % — HIGH (ref 10.3–14.5)
SODIUM SERPL-SCNC: 142 MMOL/L — SIGNIFICANT CHANGE UP (ref 135–145)
WBC # BLD: 12.84 K/UL — HIGH (ref 3.8–10.5)
WBC # FLD AUTO: 12.84 K/UL — HIGH (ref 3.8–10.5)

## 2019-11-06 RX ORDER — CEFTRIAXONE 500 MG/1
1000 INJECTION, POWDER, FOR SOLUTION INTRAMUSCULAR; INTRAVENOUS EVERY 24 HOURS
Refills: 0 | Status: DISCONTINUED | OUTPATIENT
Start: 2019-11-07 | End: 2019-11-08

## 2019-11-06 RX ORDER — CEFTRIAXONE 500 MG/1
INJECTION, POWDER, FOR SOLUTION INTRAMUSCULAR; INTRAVENOUS
Refills: 0 | Status: DISCONTINUED | OUTPATIENT
Start: 2019-11-06 | End: 2019-11-08

## 2019-11-06 RX ORDER — CEFTRIAXONE 500 MG/1
1000 INJECTION, POWDER, FOR SOLUTION INTRAMUSCULAR; INTRAVENOUS ONCE
Refills: 0 | Status: COMPLETED | OUTPATIENT
Start: 2019-11-06 | End: 2019-11-06

## 2019-11-06 RX ADMIN — MEMANTINE HYDROCHLORIDE 10 MILLIGRAM(S): 10 TABLET ORAL at 05:39

## 2019-11-06 RX ADMIN — CARVEDILOL PHOSPHATE 6.25 MILLIGRAM(S): 80 CAPSULE, EXTENDED RELEASE ORAL at 17:38

## 2019-11-06 RX ADMIN — HEPARIN SODIUM 5000 UNIT(S): 5000 INJECTION INTRAVENOUS; SUBCUTANEOUS at 17:38

## 2019-11-06 RX ADMIN — CEFTRIAXONE 1000 MILLIGRAM(S): 500 INJECTION, POWDER, FOR SOLUTION INTRAMUSCULAR; INTRAVENOUS at 14:16

## 2019-11-06 RX ADMIN — CARVEDILOL PHOSPHATE 6.25 MILLIGRAM(S): 80 CAPSULE, EXTENDED RELEASE ORAL at 05:39

## 2019-11-06 RX ADMIN — ATORVASTATIN CALCIUM 40 MILLIGRAM(S): 80 TABLET, FILM COATED ORAL at 21:39

## 2019-11-06 RX ADMIN — Medication 325 MILLIGRAM(S): at 11:11

## 2019-11-06 RX ADMIN — Medication 50 MICROGRAM(S): at 05:39

## 2019-11-06 RX ADMIN — MEMANTINE HYDROCHLORIDE 10 MILLIGRAM(S): 10 TABLET ORAL at 21:38

## 2019-11-06 RX ADMIN — TAMSULOSIN HYDROCHLORIDE 0.4 MILLIGRAM(S): 0.4 CAPSULE ORAL at 21:38

## 2019-11-06 RX ADMIN — Medication 1000 UNIT(S): at 11:11

## 2019-11-06 RX ADMIN — Medication 81 MILLIGRAM(S): at 11:11

## 2019-11-06 RX ADMIN — HEPARIN SODIUM 5000 UNIT(S): 5000 INJECTION INTRAVENOUS; SUBCUTANEOUS at 05:39

## 2019-11-06 RX ADMIN — DONEPEZIL HYDROCHLORIDE 5 MILLIGRAM(S): 10 TABLET, FILM COATED ORAL at 21:39

## 2019-11-06 NOTE — PROGRESS NOTE ADULT - ASSESSMENT
1. Syncope:  -likely vasovagal by history  -orthostatics ordered earlier + on admission, now negative, encouraged PO fluid intake  -clinically dry  -hold lasix, gentle ivf, repeat orthostatics in am. may need to dc flomax.  -PPM interrogation negative.   -f/u eeg    2. Diastolic CHf:  -compensated  -hold further lasix     3. Anemia:  -fobt negative on previous admission  -check iron studies/b12/folate/retic/etc.  -Likely iron def anemia- continue iron supplementation    4. CAD/CABG:  -continue asa/bb/statin    5. Dementia:  -continue aricept/namenda    6. DVT px    7. Dispo:  -gentle ivf, repeat orthostatics in am if still positive and euvolemic, dc flomax  -PT eval    8. Leukocytosis  -Elevation in WBC- worsening- no fever, no cough, no dysuria, no diarrhea  -UA with moderate leukocyte esterase and 26-50 WBC, CXR with possible L lung infiltrate, check urine cx, started abx

## 2019-11-06 NOTE — PROGRESS NOTE ADULT - SUBJECTIVE AND OBJECTIVE BOX
INTERVAL HPI/OVERNIGHT EVENTS:  89 y/o male with a PMHx of artificial pacemaker, CAD s/p CABG, Diastolic  CHF, Alzheimer's disease recent admission for syncope  presented to the ED 3 hours after discharge for loss of conscious while having a bowel movement. Son endorses that patient called for help from the bathroom floor. Patient does not remember episode but remembers feeling hot before episode and having some posterior neck pain. Son takes patient was incoherent/unresponsive for roughly 5 minutes with fixed and dilated pupils. Patient denies any current chest pain, palpitations, headache, dizziness, unsteady gait, bowel or bladder incontinence.    In the ED patient was noted to have a troponin of 0.073, [place]-BNP of 23672, CT head and neck negative  for acute intracranial process.     : pt seen and examined earlier. Sleeping but arousable. Denies pain/sob/chest pain.  19- Patient seen and examined at bedside. States he feels well. Patient with worsening leukocytosis. No fevers, no cough, no dysuria. Patient with + UA and CXR with possible L lung infiltrate. Urine culture obtained and abx started. Spoke with daughter over phone and updated about plan.       Review of system- All 10 systems reviewed and is as per HPI otherwise negative but limited d/t lethargy      MEDICATIONS  (STANDING):  aspirin enteric coated 81 milliGRAM(s) Oral daily  atorvastatin 40 milliGRAM(s) Oral at bedtime  carvedilol 6.25 milliGRAM(s) Oral every 12 hours  cefTRIAXone Injectable.      cholecalciferol 1000 Unit(s) Oral daily  donepezil 5 milliGRAM(s) Oral at bedtime  ferrous    sulfate 325 milliGRAM(s) Oral daily  heparin  Injectable 5000 Unit(s) SubCutaneous two times a day  levothyroxine 50 MICROGram(s) Oral daily  memantine 10 milliGRAM(s) Oral two times a day  tamsulosin 0.4 milliGRAM(s) Oral at bedtime    MEDICATIONS  (PRN):      Allergies    No Known Allergies    Intolerances      Vital Signs Last 24 Hrs  T(C): 37 (2019 17:00), Max: 37 (2019 17:00)  T(F): 98.6 (2019 17:00), Max: 98.6 (2019 17:00)  HR: 69 (2019 17:00) (59 - 69)  BP: 143/52 (2019 17:00) (106/47 - 143/52)  BP(mean): --  RR: 18 (2019 17:00) (16 - 18)  SpO2: 95% (2019 17:00) (95% - 100%)     @ 07:01  -   @ 07:00  --------------------------------------------------------  IN: 0 mL / OUT: 75 mL / NET: -75 mL      Physical Exam:  General: WN/WD NAD  Neurology: A&Ox2, nonfocal, RENDON x 4  Respiratory: CTA B/L  CV: RRR, S1S2  Abdominal: Soft, NT, ND +BS  Extremities: No edema, + peripheral pulses      LABS:                        8.4    12.84 )-----------( 122      ( 2019 07:32 )             26.3     11-06    142  |  113<H>  |  43<H>  ----------------------------<  120<H>  4.3   |  22  |  1.69<H>    Ca    8.6      2019 07:32  Phos  3.1     11-05  Mg     2.1     11-05        Urinalysis Basic - ( 2019 20:10 )    Color: Yellow / Appearance: Clear / S.015 / pH: x  Gluc: x / Ketone: Negative  / Bili: Negative / Urobili: Negative mg/dL   Blood: x / Protein: 30 mg/dL / Nitrite: Negative   Leuk Esterase: Moderate / RBC: 6-10 /HPF / WBC 26-50   Sq Epi: x / Non Sq Epi: x / Bacteria: x        RADIOLOGY & ADDITIONAL TESTS:

## 2019-11-06 NOTE — INPATIENT CERTIFICATION FOR MEDICARE PATIENTS - RISKS OF ADVERSE EVENTS
Concern for worsening infectious process/Concern for renal deterioration/Concern for delay in diagnosis and treatment/Concern for cardiopulmonary deterioration

## 2019-11-07 ENCOUNTER — TRANSCRIPTION ENCOUNTER (OUTPATIENT)
Age: 84
End: 2019-11-07

## 2019-11-07 LAB
ANION GAP SERPL CALC-SCNC: 8 MMOL/L — SIGNIFICANT CHANGE UP (ref 5–17)
BUN SERPL-MCNC: 47 MG/DL — HIGH (ref 7–23)
CALCIUM SERPL-MCNC: 8.5 MG/DL — SIGNIFICANT CHANGE UP (ref 8.5–10.1)
CHLORIDE SERPL-SCNC: 111 MMOL/L — HIGH (ref 96–108)
CO2 SERPL-SCNC: 21 MMOL/L — LOW (ref 22–31)
CREAT SERPL-MCNC: 1.82 MG/DL — HIGH (ref 0.5–1.3)
CULTURE RESULTS: SIGNIFICANT CHANGE UP
GLUCOSE SERPL-MCNC: 147 MG/DL — HIGH (ref 70–99)
HCT VFR BLD CALC: 25.4 % — LOW (ref 39–50)
HGB BLD-MCNC: 8.3 G/DL — LOW (ref 13–17)
MCHC RBC-ENTMCNC: 32.5 PG — SIGNIFICANT CHANGE UP (ref 27–34)
MCHC RBC-ENTMCNC: 32.7 GM/DL — SIGNIFICANT CHANGE UP (ref 32–36)
MCV RBC AUTO: 99.6 FL — SIGNIFICANT CHANGE UP (ref 80–100)
PLATELET # BLD AUTO: 129 K/UL — LOW (ref 150–400)
POTASSIUM SERPL-MCNC: 3.9 MMOL/L — SIGNIFICANT CHANGE UP (ref 3.5–5.3)
POTASSIUM SERPL-SCNC: 3.9 MMOL/L — SIGNIFICANT CHANGE UP (ref 3.5–5.3)
RBC # BLD: 2.55 M/UL — LOW (ref 4.2–5.8)
RBC # FLD: 16 % — HIGH (ref 10.3–14.5)
SODIUM SERPL-SCNC: 140 MMOL/L — SIGNIFICANT CHANGE UP (ref 135–145)
SPECIMEN SOURCE: SIGNIFICANT CHANGE UP
WBC # BLD: 12.02 K/UL — HIGH (ref 3.8–10.5)
WBC # FLD AUTO: 12.02 K/UL — HIGH (ref 3.8–10.5)

## 2019-11-07 RX ADMIN — Medication 325 MILLIGRAM(S): at 12:01

## 2019-11-07 RX ADMIN — MEMANTINE HYDROCHLORIDE 10 MILLIGRAM(S): 10 TABLET ORAL at 17:25

## 2019-11-07 RX ADMIN — DONEPEZIL HYDROCHLORIDE 5 MILLIGRAM(S): 10 TABLET, FILM COATED ORAL at 22:36

## 2019-11-07 RX ADMIN — TAMSULOSIN HYDROCHLORIDE 0.4 MILLIGRAM(S): 0.4 CAPSULE ORAL at 22:36

## 2019-11-07 RX ADMIN — HEPARIN SODIUM 5000 UNIT(S): 5000 INJECTION INTRAVENOUS; SUBCUTANEOUS at 06:01

## 2019-11-07 RX ADMIN — HEPARIN SODIUM 5000 UNIT(S): 5000 INJECTION INTRAVENOUS; SUBCUTANEOUS at 17:25

## 2019-11-07 RX ADMIN — Medication 81 MILLIGRAM(S): at 12:01

## 2019-11-07 RX ADMIN — CEFTRIAXONE 1000 MILLIGRAM(S): 500 INJECTION, POWDER, FOR SOLUTION INTRAMUSCULAR; INTRAVENOUS at 12:02

## 2019-11-07 RX ADMIN — MEMANTINE HYDROCHLORIDE 10 MILLIGRAM(S): 10 TABLET ORAL at 06:01

## 2019-11-07 RX ADMIN — Medication 1000 UNIT(S): at 12:01

## 2019-11-07 RX ADMIN — CARVEDILOL PHOSPHATE 6.25 MILLIGRAM(S): 80 CAPSULE, EXTENDED RELEASE ORAL at 17:25

## 2019-11-07 RX ADMIN — CARVEDILOL PHOSPHATE 6.25 MILLIGRAM(S): 80 CAPSULE, EXTENDED RELEASE ORAL at 06:01

## 2019-11-07 RX ADMIN — ATORVASTATIN CALCIUM 40 MILLIGRAM(S): 80 TABLET, FILM COATED ORAL at 22:36

## 2019-11-07 RX ADMIN — Medication 50 MICROGRAM(S): at 06:01

## 2019-11-07 NOTE — DISCHARGE NOTE PROVIDER - NSDCMRMEDTOKEN_GEN_ALL_CORE_FT
aspirin 81 mg oral delayed release tablet: 1 tab(s) orally once a day  atorvastatin 40 mg oral tablet: 1 tab(s) orally once a day (at bedtime)  carvedilol 12.5 mg oral tablet: 1 tab(s) orally 2 times a day  donepezil 5 mg oral tablet: 1 tab(s) orally once a day (at bedtime)  ferrous sulfate 325 mg (65 mg elemental iron) oral tablet: 1 tab(s) orally once a day  Flomax 0.4 mg oral capsule: 1 cap(s) orally once a day (at bedtime)  Lasix 20 mg oral tablet: 1 tab(s) orally once a day   levothyroxine 50 mcg (0.05 mg) oral tablet: 1 tab(s) orally once a day  memantine 10 mg oral tablet: 1 tab(s) orally 2 times a day  Vitamin D3 1000 intl units oral tablet: 1 tab(s) orally once a day aspirin 81 mg oral delayed release tablet: 1 tab(s) orally once a day  atorvastatin 40 mg oral tablet: 1 tab(s) orally once a day (at bedtime)  carvedilol 6.25 mg oral tablet: 1 tab(s) orally every 12 hours  cefuroxime 250 mg oral tablet: 1 tab(s) orally 2 times a day   cholecalciferol oral tablet: 1000 unit(s) orally once a day  donepezil 5 mg oral tablet: 1 tab(s) orally once a day (at bedtime)  ferrous sulfate 325 mg (65 mg elemental iron) oral tablet: 1 tab(s) orally once a day  heparin: 5000 unit(s) subcutaneous every 12 hours  levothyroxine 50 mcg (0.05 mg) oral tablet: 1 tab(s) orally once a day  memantine 10 mg oral tablet: 1 tab(s) orally 2 times a day  tamsulosin 0.4 mg oral capsule: 1 cap(s) orally once a day (at bedtime)

## 2019-11-07 NOTE — PROGRESS NOTE ADULT - SUBJECTIVE AND OBJECTIVE BOX
INTERVAL HPI/OVERNIGHT EVENTS:  89 y/o male with a PMHx of artificial pacemaker, CAD s/p CABG, Diastolic  CHF, Alzheimer's disease recent admission for syncope  presented to the ED 3 hours after discharge for loss of conscious while having a bowel movement. Son endorses that patient called for help from the bathroom floor. Patient does not remember episode but remembers feeling hot before episode and having some posterior neck pain. Son takes patient was incoherent/unresponsive for roughly 5 minutes with fixed and dilated pupils. Patient denies any current chest pain, palpitations, headache, dizziness, unsteady gait, bowel or bladder incontinence.    In the ED patient was noted to have a troponin of 0.073, [place] [address], CT head and neck negative  for acute intracranial process.     : pt seen and examined earlier. Sleeping but arousable. Denies pain/sob/chest pain.  19- Patient seen and examined at bedside. States he feels well. Patient with worsening leukocytosis. No fevers, no cough, no dysuria. Patient with + UA and CXR with possible L lung infiltrate. Urine culture obtained and abx started. Spoke with daughter over phone and updated about plan.   19- Patient seen and examined at bedside. States he feels well. Urine cx showing contamination, leukocytosis downtrending. Spoke with daughter, explained repeating urine cx will not be beneficial as patient now on abx. Can complete 5-7 day course of abx for suspected UTI. Patient likely to go to [facility] in AM.    Review of system- All 10 systems reviewed and is as per HPI otherwise negative but limited d/t dementia      MEDICATIONS  (STANDING):  aspirin enteric coated 81 milliGRAM(s) Oral daily  atorvastatin 40 milliGRAM(s) Oral at bedtime  carvedilol 6.25 milliGRAM(s) Oral every 12 hours  cefTRIAXone Injectable. 1000 milliGRAM(s) IV Push every 24 hours  cefTRIAXone Injectable.      cholecalciferol 1000 Unit(s) Oral daily  donepezil 5 milliGRAM(s) Oral at bedtime  ferrous    sulfate 325 milliGRAM(s) Oral daily  heparin  Injectable 5000 Unit(s) SubCutaneous two times a day  levothyroxine 50 MICROGram(s) Oral daily  memantine 10 milliGRAM(s) Oral two times a day  tamsulosin 0.4 milliGRAM(s) Oral at bedtime    MEDICATIONS  (PRN):      Allergies    No Known Allergies    Intolerances      Vital Signs Last 24 Hrs  T(C): 36.6 (2019 16:37), Max: 37 (2019 05:38)  T(F): 97.9 (2019 16:37), Max: 98.6 (2019 05:38)  HR: 60 (2019 16:37) (59 - 66)  BP: 130/53 (2019 16:37) (107/48 - 130/53)  BP(mean): --  RR: 18 (2019 16:37) (16 - 19)  SpO2: 97% (2019 16:37) (93% - 99%)      Physical Exam:  General: WN/WD NAD  Neurology: A&Ox2, nonfocal, RENDON x 4  Respiratory: CTA B/L  CV: RRR, S1S2  Abdominal: Soft, NT, ND +BS  Extremities: No edema, + peripheral pulses      LABS:                        8.3    12.02 )-----------( 129      ( 2019 08:15 )             25.4     11-07    140  |  111<H>  |  47<H>  ----------------------------<  147<H>  3.9   |  21<L>  |  1.82<H>    Ca    8.5      2019 08:15        Urinalysis Basic - ( 2019 20:10 )    Color: Yellow / Appearance: Clear / S.015 / pH: x  Gluc: x / Ketone: Negative  / Bili: Negative / Urobili: Negative mg/dL   Blood: x / Protein: 30 mg/dL / Nitrite: Negative   Leuk Esterase: Moderate / RBC: 6-10 /HPF / WBC 26-50   Sq Epi: x / Non Sq Epi: x / Bacteria: x        RADIOLOGY & ADDITIONAL TESTS:

## 2019-11-07 NOTE — PROGRESS NOTE ADULT - ASSESSMENT
1. Syncope:  -likely vasovagal by history  -orthostatics ordered earlier + on admission, now negative, encouraged PO fluid intake  -clinically dry  -hold lasix, gentle ivf, repeat orthostatics in am- negative. may need to dc flomax.  -PPM interrogation negative.   -f/u eeg    2. Diastolic CHf:  -compensated  -hold further lasix- discuss with Cardio regarding restarting lasix upon D/C    3. Anemia:  -fobt negative on previous admission  -check iron studies/b12/folate/retic/etc.  -Likely iron def anemia- continue iron supplementation    4. CAD/CABG:  -continue asa/bb/statin    5. Dementia:  -continue aricept/namenda    6. DVT px    7. Dispo:  -gentle ivf, repeat orthostatics in am if still positive and euvolemic, dc flomax  -PT eval    8. Leukocytosis  -Elevation in WBC- worsening- no fever, no cough, no dysuria, no diarrhea  -UA with moderate leukocyte esterase and 26-50 WBC, CXR with possible L lung infiltrate  - Urine cx contaminant- can continue abx to complete 5-7 day course    9. head trauma  - patient had 8 stitches placed on 10/30/19 after fall on head  - 8 stitches removed today in sterile fashion

## 2019-11-07 NOTE — DISCHARGE NOTE PROVIDER - NSDCCPCAREPLAN_GEN_ALL_CORE_FT
PRINCIPAL DISCHARGE DIAGNOSIS  Diagnosis: Syncope  Assessment and Plan of Treatment:       SECONDARY DISCHARGE DIAGNOSES  Diagnosis: Elevated troponin  Assessment and Plan of Treatment:

## 2019-11-07 NOTE — DISCHARGE NOTE PROVIDER - CARE PROVIDER_API CALL
Shamar Sandoval)  Internal Medicine  [address] [place] [address]  Phone: [phone]  Fax: [phone]  Established Patient  Follow Up Time: Routine

## 2019-11-07 NOTE — DISCHARGE NOTE PROVIDER - HOSPITAL COURSE
89 y/o male with a PMHx of artificial pacemaker, CAD s/p CABG, Diastolic  CHF, Alzheimer's disease recent admission for syncope  presented to the ED 3 hours after discharge for loss of conscious while having a bowel movement. Son endorses that patient called for help from the bathroom floor. Patient does not remember episode but remembers feeling hot before episode and having some posterior neck pain. Son takes patient was incoherent/unresponsive for roughly 5 minutes with fixed and dilated pupils. Patient denies any current chest pain, palpitations, headache, dizziness, unsteady gait, bowel or bladder incontinence.        In the ED patient was noted to have a troponin of 0.073, [place] of [address], CT head and neck negative  for acute intracranial process.         11/4: pt seen and examined earlier. Sleeping but arousable. Denies pain/sob/chest pain.    11/6/19- Patient seen and examined at bedside. States he feels well. Patient with worsening leukocytosis. No fevers, no cough, no dysuria. Patient with + UA and CXR with possible L lung infiltrate. Urine culture obtained and abx started. Spoke with daughter over phone and updated about plan.     11/7/19- Patient seen and examined at bedside. States he feels well. Urine cx showing contamination, leukocytosis downtrending. Spoke with daughter, explained repeating urine cx will not be beneficial as patient now on abx. Can complete 5-7 day course of abx for suspected UTI. Patient likely to go to [facility] in AM.        Physical Exam:    General: WN/WD NAD    Neurology: A&Ox2, nonfocal, RENDON x 4    Respiratory: CTA B/L    CV: RRR, S1S2    Abdominal: Soft, NT, ND +BS    Extremities: No edema, + peripheral pulses        1. Syncope:    -likely vasovagal by history    -orthostatics ordered earlier + on admission, now negative, encouraged PO fluid intake    -clinically dry    -hold lasix, gentle ivf, repeat orthostatics in am- negative. may need to dc flomax.    -PPM interrogation negative.     -f/u eeg        2. Diastolic CHf:    -compensated    -hold further lasix- discuss with Cardio regarding restarting lasix upon D/C        3. Anemia:    -fobt negative on previous admission    -check iron studies/b12/folate/retic/etc.    -Likely iron def anemia- continue iron supplementation        4. CAD/CABG:    -continue asa/bb/statin        5. Dementia:    -continue aricept/namenda        6. DVT px        7. Dispo:    -gentle ivf, repeat orthostatics in am if still positive and euvolemic, dc flomax    -PT eval        8. Leukocytosis    -Elevation in WBC- worsening- no fever, no cough, no dysuria, no diarrhea    -UA with moderate leukocyte esterase and 26-50 WBC, CXR with possible L lung infiltrate    - Urine cx contaminant- can continue abx to complete 5-7 day course        9. head trauma    - patient had 8 stitches placed on 10/30/19 after fall on head    - 8 stitches removed today in sterile fashion

## 2019-11-08 ENCOUNTER — TRANSCRIPTION ENCOUNTER (OUTPATIENT)
Age: 84
End: 2019-11-08

## 2019-11-08 VITALS
DIASTOLIC BLOOD PRESSURE: 49 MMHG | SYSTOLIC BLOOD PRESSURE: 111 MMHG | OXYGEN SATURATION: 93 % | HEART RATE: 61 BPM | TEMPERATURE: 98 F | RESPIRATION RATE: 17 BRPM

## 2019-11-08 RX ORDER — ASPIRIN/CALCIUM CARB/MAGNESIUM 324 MG
1 TABLET ORAL
Qty: 0 | Refills: 0 | DISCHARGE
Start: 2019-11-08

## 2019-11-08 RX ORDER — ATORVASTATIN CALCIUM 80 MG/1
1 TABLET, FILM COATED ORAL
Qty: 0 | Refills: 0 | DISCHARGE

## 2019-11-08 RX ORDER — LEVOTHYROXINE SODIUM 125 MCG
1 TABLET ORAL
Qty: 0 | Refills: 0 | DISCHARGE

## 2019-11-08 RX ORDER — CHOLECALCIFEROL (VITAMIN D3) 125 MCG
1000 CAPSULE ORAL
Qty: 0 | Refills: 0 | DISCHARGE
Start: 2019-11-08

## 2019-11-08 RX ORDER — CARVEDILOL PHOSPHATE 80 MG/1
1 CAPSULE, EXTENDED RELEASE ORAL
Qty: 0 | Refills: 0 | DISCHARGE

## 2019-11-08 RX ORDER — DONEPEZIL HYDROCHLORIDE 10 MG/1
1 TABLET, FILM COATED ORAL
Qty: 0 | Refills: 0 | DISCHARGE

## 2019-11-08 RX ORDER — CEFUROXIME AXETIL 250 MG
1 TABLET ORAL
Qty: 10 | Refills: 0
Start: 2019-11-08 | End: 2019-11-12

## 2019-11-08 RX ORDER — CHOLECALCIFEROL (VITAMIN D3) 125 MCG
1 CAPSULE ORAL
Qty: 0 | Refills: 0 | DISCHARGE

## 2019-11-08 RX ORDER — MEMANTINE HYDROCHLORIDE 10 MG/1
1 TABLET ORAL
Qty: 0 | Refills: 0 | DISCHARGE
Start: 2019-11-08

## 2019-11-08 RX ORDER — MEMANTINE HYDROCHLORIDE 10 MG/1
1 TABLET ORAL
Qty: 0 | Refills: 0 | DISCHARGE

## 2019-11-08 RX ORDER — ATORVASTATIN CALCIUM 80 MG/1
1 TABLET, FILM COATED ORAL
Qty: 0 | Refills: 0 | DISCHARGE
Start: 2019-11-08

## 2019-11-08 RX ORDER — LEVOTHYROXINE SODIUM 125 MCG
1 TABLET ORAL
Qty: 0 | Refills: 0 | DISCHARGE
Start: 2019-11-08

## 2019-11-08 RX ORDER — HEPARIN SODIUM 5000 [USP'U]/ML
5000 INJECTION INTRAVENOUS; SUBCUTANEOUS
Qty: 0 | Refills: 0 | DISCHARGE
Start: 2019-11-08

## 2019-11-08 RX ORDER — TAMSULOSIN HYDROCHLORIDE 0.4 MG/1
1 CAPSULE ORAL
Qty: 0 | Refills: 0 | DISCHARGE
Start: 2019-11-08

## 2019-11-08 RX ORDER — DONEPEZIL HYDROCHLORIDE 10 MG/1
1 TABLET, FILM COATED ORAL
Qty: 0 | Refills: 0 | DISCHARGE
Start: 2019-11-08

## 2019-11-08 RX ORDER — TAMSULOSIN HYDROCHLORIDE 0.4 MG/1
1 CAPSULE ORAL
Qty: 0 | Refills: 0 | DISCHARGE

## 2019-11-08 RX ORDER — CARVEDILOL PHOSPHATE 80 MG/1
1 CAPSULE, EXTENDED RELEASE ORAL
Qty: 0 | Refills: 0 | DISCHARGE
Start: 2019-11-08

## 2019-11-08 RX ADMIN — Medication 1000 UNIT(S): at 11:22

## 2019-11-08 RX ADMIN — MEMANTINE HYDROCHLORIDE 10 MILLIGRAM(S): 10 TABLET ORAL at 06:23

## 2019-11-08 RX ADMIN — HEPARIN SODIUM 5000 UNIT(S): 5000 INJECTION INTRAVENOUS; SUBCUTANEOUS at 06:22

## 2019-11-08 RX ADMIN — Medication 325 MILLIGRAM(S): at 11:22

## 2019-11-08 RX ADMIN — Medication 81 MILLIGRAM(S): at 11:22

## 2019-11-08 RX ADMIN — Medication 50 MICROGRAM(S): at 06:23

## 2019-11-08 RX ADMIN — CEFTRIAXONE 1000 MILLIGRAM(S): 500 INJECTION, POWDER, FOR SOLUTION INTRAMUSCULAR; INTRAVENOUS at 11:23

## 2019-11-08 RX ADMIN — CARVEDILOL PHOSPHATE 6.25 MILLIGRAM(S): 80 CAPSULE, EXTENDED RELEASE ORAL at 06:22

## 2019-11-08 NOTE — PROGRESS NOTE ADULT - ASSESSMENT
vasovagal syncope.  orthostatics normalized after hydration.  PPM interrogated, wnl.  continue to hold Lasix for now.    anemia.  HH stable.  stool OB (-).    leukocytosis.  normalized.  UA (+) pyuria.  UCx contaminated.  DC ceftriaxone.  c/w Ceftin 250mg po bid x 5 more days.    head trauma.  sutures removed.    hx HFpEF.  compensated.  resume Lasix at the discretion of PMD and Cardiologist.    hx CAD-CABG.  c/w medical management.    hx dementia.  Aricept + Namenda.    disposition.  may discharge to [facility] today.

## 2019-11-08 NOTE — PROGRESS NOTE ADULT - SUBJECTIVE AND OBJECTIVE BOX
CC:  Patient is a 89y old  Male who presents with a chief complaint of Syncope (07 Nov 2019 17:38)    SUBJECTIVE: OOB in chair.    -states he is keeping hydrated.  -orthostatics wnl.    ROS:  all other review of systems are negative unless indicated above.    aspirin enteric coated 81 milliGRAM(s) Oral daily  atorvastatin 40 milliGRAM(s) Oral at bedtime  carvedilol 6.25 milliGRAM(s) Oral every 12 hours  cefTRIAXone Injectable. 1000 milliGRAM(s) IV Push every 24 hours  cefTRIAXone Injectable.      cholecalciferol 1000 Unit(s) Oral daily  donepezil 5 milliGRAM(s) Oral at bedtime  ferrous    sulfate 325 milliGRAM(s) Oral daily  heparin  Injectable 5000 Unit(s) SubCutaneous two times a day  levothyroxine 50 MICROGram(s) Oral daily  memantine 10 milliGRAM(s) Oral two times a day  tamsulosin 0.4 milliGRAM(s) Oral at bedtime    T(C): 36.7 (11-08-19 @ 10:57), Max: 37.4 (11-08-19 @ 06:05)  HR: 61 (11-08-19 @ 10:57) (60 - 61)  BP: 111/49 (11-08-19 @ 10:57) (101/46 - 132/56)  RR: 17 (11-08-19 @ 10:57) (17 - 18)  SpO2: 93% (11-08-19 @ 10:57) (93% - 97%)    Constitutional: NAD.   HEENT: PERRL, EOMI, MMM.  Neck: Soft and supple, No carotid bruit, No JVD  Respiratory: Breath sounds are clear bilaterally, No wheezing, rales or rhonchi  Cardiovascular: S1 and S2, regular rate and rhythm, no murmur, rub or gallop.  Gastrointestinal: Bowel Sounds present, soft, nontender, nondistended, no guarding, no rebound, no mass.  Extremities: No peripheral edema  Vascular: 2+ peripheral pulses  Neurological: A/O x 2-3, no focal deficits  Musculoskeletal: 5/5 strength b/l upper and lower extremities  Skin:  no visible rashes.                         8.3    12.02 )-----------( 129      ( 07 Nov 2019 08:15 )             25.4       11-07    140  |  111<H>  |  47<H>  ----------------------------<  147<H>  3.9   |  21<L>  |  1.82<H>    Ca    8.5      07 Nov 2019 08:15

## 2019-11-08 NOTE — DISCHARGE NOTE NURSING/CASE MANAGEMENT/SOCIAL WORK - PATIENT PORTAL LINK FT
You can access the FollowMyHealth Patient Portal offered by [facility] by registering at the following website: [email or web address]. By joining [employer]’s FollowMyHealth portal, you will also be able to view your health information using other applications (apps) compatible with our system.

## 2019-11-12 DIAGNOSIS — I25.10 ATHEROSCLEROTIC HEART DISEASE OF NATIVE CORONARY ARTERY WITHOUT ANGINA PECTORIS: ICD-10-CM

## 2019-11-12 DIAGNOSIS — N40.0 BENIGN PROSTATIC HYPERPLASIA WITHOUT LOWER URINARY TRACT SYMPTOMS: ICD-10-CM

## 2019-11-12 DIAGNOSIS — I48.91 UNSPECIFIED ATRIAL FIBRILLATION: ICD-10-CM

## 2019-11-12 DIAGNOSIS — Z95.1 PRESENCE OF AORTOCORONARY BYPASS GRAFT: ICD-10-CM

## 2019-11-12 DIAGNOSIS — G30.9 ALZHEIMER'S DISEASE, UNSPECIFIED: ICD-10-CM

## 2019-11-12 DIAGNOSIS — D72.829 ELEVATED WHITE BLOOD CELL COUNT, UNSPECIFIED: ICD-10-CM

## 2019-11-12 DIAGNOSIS — E03.9 HYPOTHYROIDISM, UNSPECIFIED: ICD-10-CM

## 2019-11-12 DIAGNOSIS — E78.5 HYPERLIPIDEMIA, UNSPECIFIED: ICD-10-CM

## 2019-11-12 DIAGNOSIS — N18.9 CHRONIC KIDNEY DISEASE, UNSPECIFIED: ICD-10-CM

## 2019-11-12 DIAGNOSIS — I50.32 CHRONIC DIASTOLIC (CONGESTIVE) HEART FAILURE: ICD-10-CM

## 2019-11-12 DIAGNOSIS — I13.0 HYPERTENSIVE HEART AND CHRONIC KIDNEY DISEASE WITH HEART FAILURE AND STAGE 1 THROUGH STAGE 4 CHRONIC KIDNEY DISEASE, OR UNSPECIFIED CHRONIC KIDNEY DISEASE: ICD-10-CM

## 2019-11-12 DIAGNOSIS — D64.9 ANEMIA, UNSPECIFIED: ICD-10-CM

## 2019-11-12 DIAGNOSIS — Z95.0 PRESENCE OF CARDIAC PACEMAKER: ICD-10-CM

## 2019-11-12 DIAGNOSIS — Z87.891 PERSONAL HISTORY OF NICOTINE DEPENDENCE: ICD-10-CM

## 2019-11-12 DIAGNOSIS — Z79.899 OTHER LONG TERM (CURRENT) DRUG THERAPY: ICD-10-CM

## 2019-11-12 DIAGNOSIS — F02.80 DEMENTIA IN OTHER DISEASES CLASSIFIED ELSEWHERE, UNSPECIFIED SEVERITY, WITHOUT BEHAVIORAL DISTURBANCE, PSYCHOTIC DISTURBANCE, MOOD DISTURBANCE, AND ANXIETY: ICD-10-CM

## 2019-11-12 DIAGNOSIS — Z79.82 LONG TERM (CURRENT) USE OF ASPIRIN: ICD-10-CM

## 2019-11-12 DIAGNOSIS — R55 SYNCOPE AND COLLAPSE: ICD-10-CM

## 2019-12-18 ENCOUNTER — APPOINTMENT (OUTPATIENT)
Dept: ELECTROPHYSIOLOGY | Facility: CLINIC | Age: 84
End: 2019-12-18

## 2019-12-27 ENCOUNTER — APPOINTMENT (OUTPATIENT)
Dept: ELECTROPHYSIOLOGY | Facility: CLINIC | Age: 84
End: 2019-12-27
Payer: MEDICARE

## 2019-12-27 DIAGNOSIS — I49.5 SICK SINUS SYNDROME: ICD-10-CM

## 2019-12-27 DIAGNOSIS — Z95.0 PRESENCE OF CARDIAC PACEMAKER: ICD-10-CM

## 2019-12-27 PROCEDURE — 93296 REM INTERROG EVL PM/IDS: CPT

## 2019-12-27 PROCEDURE — 93294 REM INTERROG EVL PM/LDLS PM: CPT

## 2020-01-10 PROBLEM — Z95.0 CARDIAC PACEMAKER IN SITU: Status: ACTIVE | Noted: 2017-06-06

## 2020-01-10 PROBLEM — I49.5 SICK SINUS SYNDROME: Status: ACTIVE | Noted: 2017-06-06

## 2020-02-07 ENCOUNTER — APPOINTMENT (OUTPATIENT)
Dept: ELECTROPHYSIOLOGY | Facility: CLINIC | Age: 85
End: 2020-02-07

## 2020-04-01 ENCOUNTER — APPOINTMENT (OUTPATIENT)
Dept: ELECTROPHYSIOLOGY | Facility: CLINIC | Age: 85
End: 2020-04-01

## 2020-08-02 NOTE — ED PROVIDER NOTE - TOBACCO USE
Patient Education     Return for new or worsening symptoms such as severe pain, fever, lightheadedness, passing out, bleeding through more than 1 pad per hour. Call your obstetrician tomorrow. Threatened Miscarriage: Care Instructions  Your Care Instructions     Some women have light spotting or bleeding during the first 12 weeks of pregnancy. In some cases this is normal. Light spotting or bleeding can also be a sign of a possible loss of the pregnancy. This is called a threatened miscarriage. At this point, the doctor may not be able to tell if your vaginal bleeding is normal or is a sign of a miscarriage. In early pregnancy, things such as stress, exercise, and sex do not cause miscarriage. You may be worried or upset about the possibility of losing your pregnancy. But do not blame yourself. There is no treatment to stop a threatened miscarriage. If you do have a miscarriage, there was nothing you could have done to prevent it. A miscarriage usually means that the pregnancy is not developing normally. The doctor has checked you carefully, but problems can develop later. If you notice any problems or new symptoms, get medical treatment right away. Follow-up care is a key part of your treatment and safety. Be sure to make and go to all appointments, and call your doctor if you are having problems. It's also a good idea to know your test results and keep a list of the medicines you take. How can you care for yourself at home? · If you do have a miscarriage, you will probably have some vaginal bleeding for 1 to 2 weeks. Use pads instead of tampons. · Take acetaminophen (Tylenol) for cramps. Read and follow all instructions on the label. · Do not take two or more pain medicines at the same time unless the doctor told you to. Many pain medicines have acetaminophen, which is Tylenol. Too much acetaminophen (Tylenol) can be harmful. · Do not have sex until your doctor says it is okay.   · Get lots of rest over the next several days. · You may do your normal activities if you feel well enough to do them. But do not do any heavy exercise until your doctor says it is okay. · Eat a balanced diet that is high in iron and vitamin C. Foods rich in iron include red meat, shellfish, eggs, beans, and leafy green vegetables. Foods high in vitamin C include citrus fruits, tomatoes, and broccoli. Talk to your doctor about whether you need to take iron pills or a multivitamin. · Do not drink alcohol or use tobacco or illegal drugs. · Do not smoke. If you need help quitting, talk to your doctor about stop-smoking programs and medicines. These can increase your chances of quitting for good. When should you call for help? [id number] anytime you think you may need emergency care. For example, call if:  · You passed out (lost consciousness). Call your doctor now or seek immediate medical care if:  · You have severe vaginal bleeding. · You are dizzy or lightheaded, or you feel like you may faint. · You have new or worse pain in your belly or pelvis. · You have a fever. · You have vaginal discharge that smells bad. Watch closely for changes in your health, and be sure to contact your doctor if:  · You do not get better as expected. Where can you learn more? Go to http://www.gray.com/  Enter [id number] in the search box to learn more about \"Threatened Miscarriage: Care Instructions. \"  Current as of: February 11, 2020               Content Version: 12.5  © 2006-2020 [employer]. Care instructions adapted under license by "[employer] (which disclaims liability or warranty for this information). If you have questions about a medical condition or this instruction, always ask your healthcare professional. [address] any warranty or liability for your use of this information. Unknown if ever smoked

## 2020-09-21 NOTE — ED PROVIDER NOTE - INPATIENT RESIDENT/ACP NOTIFIED DATE
I have examined pt personally Hx chart lab and xrays reviewed and pt discussed with residents 01-Nov-2019 17:37

## 2021-08-13 NOTE — ED ADULT NURSE NOTE - PAIN RATING/NUMBER SCALE (0-10): REST
Spoke with mom about figuring out how to get [facility]'s letter sent to her through the [facility]. I mentioned calling her back either way when this is resolved.    0

## 2023-02-02 NOTE — ED ADULT NURSE NOTE - CAS EDP DISCH DISPOSITION ADMI
Telemetry Ketoconazole Pregnancy And Lactation Text: This medication is Pregnancy Category C and it isn't know if it is safe during pregnancy. It is also excreted in breast milk and breast feeding isn't recommended.

## 2023-02-08 NOTE — ED PROVIDER NOTE - NS ED SCRIBE STATEMENT
Attending Glycopyrrolate Pregnancy And Lactation Text: This medication is Pregnancy Category B and is considered safe during pregnancy. It is unknown if it is excreted breast milk.

## 2023-02-10 NOTE — ED ADULT TRIAGE NOTE - TEMPERATURE IN CELSIUS (DEGREES C)
02-09    142  |  107  |  14  ----------------------------<  102<H>  3.9   |  26  |  0.91    Ca    9.3      09 Feb 2023 06:56  Phos  2.8     02-09  Mg     1.9     02-09    
36.4

## 2023-09-05 NOTE — ED ADULT NURSE NOTE - CAS DISCH BELONGINGS RETURNED
Chart reviewed. Patient seen seated in chair in PT/OT preoperative assessment room with no apparent distress. Patient underwent pre-operative consultation to determine current functional mobility limitations to determine appropriate need for assistive devices.
Not applicable

## 2023-12-18 NOTE — ED PROVIDER NOTE - SKIN [+], MLM
LACERATION
[FreeTextEntry1] : 61M follow up   BP in office within goal range  BP goal <140/80  healthy diet and physical activity as tolerated continue Norvasc and statin  f/u with cardio  reviewed cardio consult note  reviewed prior labs   hypothyroidism:  given lab script to check TSH due to dose change   B12 shot given  continue all medications as directed  RTO as routine for follow up.

## 2024-03-15 NOTE — H&P ADULT - HISTORY OF PRESENT ILLNESS
Head, normocephalic, atraumatic, Face, Face within normal limits, Ears, External ears within normal limits, Nose/Nasopharynx, External nose normal appearance, nares patent, no nasal discharge, Mouth and Throat, Oral cavity appearance normal, Lips, Appearance normal
The patient is a 89 yo male with Hx. of CAD s/p CABG, CHF,  Atrial fib not on AC, becasue of bruising and bleeding, s/p PPM, [place]  who pressented in the ER for worsening SOB for the past few days.    Family Hx.: Mother  of old age at 87,  Father had CVA at age 67,

## 2024-06-07 NOTE — PATIENT PROFILE ADULT - NSPROMEDSBROUGHTTOHOSP_GEN_A_NUR
ANDREW Hand is a 31 y.o. male  presents with complaint of chest/nasal congestion, green mucus, sinus pressure, upper dental pain, mild SOA with exertion, wheezing (worse lying down). History of borderline asthma. Denies fever, chills, sweats, earache, CP. Has taken dayquil/nyquil but symptoms have worsened.     Review of Systems    Past Medical History:   Diagnosis Date    ADHD (attention deficit hyperactivity disorder)     Allergic     Anxiety     Depression        Family History   Problem Relation Age of Onset    Anxiety disorder Father     Depression Father     Hypertension Maternal Grandfather     Heart disease Maternal Grandfather     Mental illness Maternal Grandfather        Social History     Socioeconomic History    Marital status: Single   Tobacco Use    Smoking status: Every Day     Current packs/day: 0.00     Types: Cigarettes     Last attempt to quit: 2019     Years since quittin.5    Smokeless tobacco: Former     Types: Snuff   Vaping Use    Vaping status: Never Used   Substance and Sexual Activity    Alcohol use: Yes     Comment: 5-10 drinks weekly    Drug use: Yes     Types: Marijuana     Comment: daily marijauna user          There were no vitals taken for this visit.    PHYSICAL EXAM  Physical Exam   Constitutional: He appears well-developed and well-nourished.   HENT:   Head: Normocephalic.   Nose: Right sinus exhibits maxillary sinus tenderness. Left sinus exhibits maxillary sinus tenderness.   Neck: Neck normal appearance.  Pulmonary/Chest: Effort normal.   Neurological: He is alert.   Psychiatric: He has a normal mood and affect. His speech is normal.       Diagnoses and all orders for this visit:    1. Acute non-recurrent maxillary sinusitis (Primary)  -     loratadine (Claritin) 10 MG tablet; Take 1 tablet by mouth Daily.  Dispense: 30 tablet; Refill: 0  -     albuterol sulfate  (90 Base) MCG/ACT inhaler; Inhale 2 puffs Every 4 (Four) Hours As Needed for Wheezing.   Dispense: 18 g; Refill: 0  -     predniSONE (DELTASONE) 20 MG tablet; Take 1 tablet by mouth 2 (Two) Times a Day for 5 days.  Dispense: 10 tablet; Refill: 0  -     amoxicillin-clavulanate (AUGMENTIN) 875-125 MG per tablet; Take 1 tablet by mouth 2 (Two) Times a Day for 7 days.  Dispense: 14 tablet; Refill: 0          FOLLOW-UP  As discussed during visit with [facility], if symptoms worsen or fail to improve, follow-up with PCP/Urgent Care/Emergency Department.    Patient verbalizes understanding of medications, instructions for treatment and follow-up.    OMAYRA Best  06/07/2024  10:29 EDT    The use of a video visit has been reviewed with the patient and verbal informed consent has been obtained. Myself and Marcos Hand participated in this visit. The patient is located in [place], [place], and I am located in [place], [place]. [employer] and [employer] Client were utilized.        no
